# Patient Record
Sex: MALE | Race: WHITE | NOT HISPANIC OR LATINO | Employment: FULL TIME | URBAN - METROPOLITAN AREA
[De-identification: names, ages, dates, MRNs, and addresses within clinical notes are randomized per-mention and may not be internally consistent; named-entity substitution may affect disease eponyms.]

---

## 2022-03-19 ENCOUNTER — OFFICE VISIT (OUTPATIENT)
Dept: FAMILY MEDICINE CLINIC | Facility: CLINIC | Age: 27
End: 2022-03-19
Payer: COMMERCIAL

## 2022-03-19 VITALS
DIASTOLIC BLOOD PRESSURE: 68 MMHG | SYSTOLIC BLOOD PRESSURE: 128 MMHG | OXYGEN SATURATION: 99 % | BODY MASS INDEX: 28.08 KG/M2 | WEIGHT: 200.6 LBS | HEIGHT: 71 IN | HEART RATE: 80 BPM

## 2022-03-19 DIAGNOSIS — M25.511 ACUTE PAIN OF RIGHT SHOULDER: Primary | ICD-10-CM

## 2022-03-19 DIAGNOSIS — R19.03 RIGHT LOWER QUADRANT ABDOMINAL MASS: ICD-10-CM

## 2022-03-19 DIAGNOSIS — R10.11 RIGHT UPPER QUADRANT ABDOMINAL PAIN: ICD-10-CM

## 2022-03-19 PROCEDURE — 1036F TOBACCO NON-USER: CPT | Performed by: PHYSICIAN ASSISTANT

## 2022-03-19 PROCEDURE — 3008F BODY MASS INDEX DOCD: CPT | Performed by: PHYSICIAN ASSISTANT

## 2022-03-19 PROCEDURE — 99214 OFFICE O/P EST MOD 30 MIN: CPT | Performed by: PHYSICIAN ASSISTANT

## 2022-03-19 RX ORDER — DIPHENOXYLATE HYDROCHLORIDE AND ATROPINE SULFATE 2.5; .025 MG/1; MG/1
1 TABLET ORAL DAILY
COMMUNITY

## 2022-03-19 NOTE — PATIENT INSTRUCTIONS
Assessment/plan:  1  Right lower abdominal pain -possible hernia developing here  There is nothing obvious on exam and no bulge that is palpable at this time however patient does feel lump there periodically  No evidence of strangulated hernia  Would recommend CT scan of the abdomen to better assess and consider if surgical intervention is necessary  Symptoms have been chronic and no sign of acute process going on  2   Right shoulder pain- this has been going on for a few weeks and patient has pain at the anterior joint space as well as the serrated small school  Possibly 2 different etiologies going on  Would recommend consult with physical therapy  Consider further evaluation by orthopedic specialist if not improving in 2-4 weeks

## 2022-03-19 NOTE — PROGRESS NOTES
Assessment and Plan:     Patient Instructions     Assessment/plan:  1  Right lower abdominal pain -possible hernia developing here  There is nothing obvious on exam and no bulge that is palpable at this time however patient does feel lump there periodically  No evidence of strangulated hernia  Would recommend CT scan of the abdomen to better assess and consider if surgical intervention is necessary  Symptoms have been chronic and no sign of acute process going on  2   Right shoulder pain- this has been going on for a few weeks and patient has pain at the anterior joint space as well as the serrated small school  Possibly 2 different etiologies going on  Would recommend consult with physical therapy  Consider further evaluation by orthopedic specialist if not improving in 2-4 weeks  Problem List Items Addressed This Visit     None      Visit Diagnoses     Acute pain of right shoulder    -  Primary    Relevant Orders    Ambulatory Referral to Physical Therapy    Right upper quadrant abdominal pain        Relevant Orders    CT abdomen pelvis w contrast    Right lower quadrant abdominal mass        Relevant Orders    CT abdomen pelvis w contrast                 Diagnoses and all orders for this visit:    Acute pain of right shoulder  -     Ambulatory Referral to Physical Therapy; Future    Right upper quadrant abdominal pain  -     CT abdomen pelvis w contrast; Future    Right lower quadrant abdominal mass  -     CT abdomen pelvis w contrast; Future    Other orders  -     multivitamin (THERAGRAN) TABS; Take 1 tablet by mouth daily              Subjective:      Patient ID: Mari Brown is a 23 y o  male  CC:    Chief Complaint   Patient presents with   [facility] Establish Care    Abdominal Pain     Pt states he has noticed over the past few years there is swelling in his bottom right abdominal area and now he feels as though there is a " indent" there and a small lump and he wonders if he has a hernia   kw HPI:      HPI:  This is a 81-year-old gentleman that presents to the office with concerns over some discomfort in his right lower abdominal area  He has noticed somewhat of a indentation in his muscle in this area and occasionally feels a bulge or a lump in this area  It has been bothersome and aggravated when he is doing abdominal exercises or weightlifting which he does quite frequently  He notices that the symptoms have been going on for few months but seem to be getting worse  He has not had any fevers or other signs of infection present  He is also concerned because he has been having right shoulder pain which has been getting worse  This is been in the past few weeks and he is having pain in the axillary area as well as the anterior joint space  The following portions of the patient's history were reviewed and updated as appropriate: allergies, current medications, past family history, past medical history, past social history, past surgical history and problem list       Review of Systems   Constitutional: Negative for chills, fatigue and fever  HENT: Negative for congestion, ear pain and sinus pressure  Eyes: Negative for visual disturbance  Respiratory: Negative for cough, chest tightness and shortness of breath  Cardiovascular: Negative for chest pain and palpitations  Gastrointestinal: Positive for abdominal pain  Negative for diarrhea, nausea and vomiting  Endocrine: Negative for polyuria  Genitourinary: Negative for dysuria and frequency  Musculoskeletal: Positive for arthralgias and myalgias  Skin: Negative for pallor and rash  Neurological: Negative for dizziness, weakness, light-headedness, numbness and headaches  Psychiatric/Behavioral: Negative for agitation, behavioral problems and sleep disturbance  All other systems reviewed and are negative          Data to review:       Objective:    Vitals:    03/19/22 0831   BP: 128/68   BP Location: Left arm Patient Position: Sitting   Pulse: 80   SpO2: 99%   Weight: 91 kg (200 lb 9 6 oz)   Height: 5' 11 25" (1 81 m)        Physical Exam  Constitutional:       General: He is not in acute distress  Appearance: He is well-developed  HENT:      Head: Normocephalic and atraumatic  Right Ear: Tympanic membrane normal       Left Ear: Tympanic membrane normal    Eyes:      Conjunctiva/sclera: Conjunctivae normal    Cardiovascular:      Rate and Rhythm: Normal rate and regular rhythm  Pulmonary:      Effort: Pulmonary effort is normal    Abdominal:      General: Abdomen is flat  Bowel sounds are normal  There is no distension  Palpations: Abdomen is soft  There is no mass  Tenderness: There is abdominal tenderness in the right lower quadrant  There is no right CVA tenderness, left CVA tenderness, guarding or rebound  Comments:   Patient does have palpable indentation, possible fascial defect at the right rectus abdominis  There is no palpable mass or bulge noted here or definitive hernia present  Musculoskeletal:         General: Normal range of motion  Cervical back: Normal range of motion  Skin:     General: Skin is warm  Findings: No rash  Neurological:      Mental Status: He is alert and oriented to person, place, and time  Psychiatric:         Mood and Affect: Mood normal            BMI Counseling: Body mass index is 27 78 kg/m²  The BMI is above normal  Nutrition recommendations include decreasing portion sizes  Exercise recommendations include exercising 3-5 times per week  Rationale for BMI follow-up plan is due to patient being overweight or obese  BMI Counseling: Body mass index is 27 78 kg/m²  The BMI is above normal  Nutrition recommendations include reducing portion sizes

## 2022-03-31 ENCOUNTER — HOSPITAL ENCOUNTER (OUTPATIENT)
Dept: CT IMAGING | Facility: HOSPITAL | Age: 27
Discharge: HOME | End: 2022-03-31
Payer: COMMERCIAL

## 2022-03-31 DIAGNOSIS — R19.03 RIGHT LOWER QUADRANT ABDOMINAL MASS: ICD-10-CM

## 2022-03-31 DIAGNOSIS — R10.11 RIGHT UPPER QUADRANT ABDOMINAL PAIN: ICD-10-CM

## 2022-03-31 PROCEDURE — 74177 CT ABD & PELVIS W/CONTRAST: CPT

## 2022-03-31 RX ADMIN — IOHEXOL 100 ML: 350 INJECTION, SOLUTION INTRAVENOUS at 19:59

## 2022-04-05 ENCOUNTER — EVALUATION (OUTPATIENT)
Dept: PHYSICAL THERAPY | Facility: CLINIC | Age: 27
End: 2022-04-05
Payer: COMMERCIAL

## 2022-04-05 DIAGNOSIS — M25.511 ACUTE PAIN OF RIGHT SHOULDER: Primary | ICD-10-CM

## 2022-04-05 PROCEDURE — 97161 PT EVAL LOW COMPLEX 20 MIN: CPT

## 2022-04-05 PROCEDURE — 97110 THERAPEUTIC EXERCISES: CPT

## 2022-04-05 PROCEDURE — 97530 THERAPEUTIC ACTIVITIES: CPT

## 2022-04-05 NOTE — PROGRESS NOTES
PT Evaluation     Today's date: 2022  Patient name: Pari Lindquist  : 1995  MRN: [id number]  Referring provider: Rita Dubose PA-C  Dx:   Encounter Diagnosis     ICD-10-CM    1  Acute pain of right shoulder  M25 511 Ambulatory Referral to Physical Therapy                  Assessment  Assessment details: Pari Lindquist is a 23 y o  male who presents today to outpatient therapy for evaluation of acute pain of right shoulder  Upon assessment today, pt exhibits postural deviations; decreased/painful (R) shoulder AROM; decreased periscapular strength; TTP thru the (R) serratus anterior and pec; and decreased C/S AROM  These impairments are contributing to functional limitations with weight lifting and sitting comfortably  Pt would therefore benefit from PT intervention in order to address the aforementioned deficits so that he can return to his PLOF and function comfortably/safely in his home and surrounding environment  Thank you for the referral! - Warren Brand, PT, DPT  Impairments: abnormal or restricted ROM, impaired physical strength, pain with function, poor posture  and poor body mechanics  Understanding of Dx/Px/POC: good   Prognosis: good    Goals  STG 1: Pt will demonstrate compliance with HEP to supplement therapy in 1-2 weeks  STG 2: Pt will report 25% reduction in pain with functional activities in 2-4 weeks  STG 3: Pt will be able to reach [facility] with min increased symptoms in 6-8 weeks  LTG 1: Pt will be able to resume weight lifting at the gym with min to no difficulty related to the (R) UE in 6-8 weeks  LTG 2: Pt will be able to sit comfortably against a chair with min increased symptoms in 6-8 weeks   INITIAL: Pt reports difficulty secondary to immediate discomfort thru the (R) scapula    Plan  Plan details: Educated pt today about PT goals; prognosis; diagnosis; serratus anterior anatomy/function/sprain; role of scapula during shoulder elevation; scapular winging and relationship to SA; and role of scapular strength  Patient would benefit from: skilled physical therapy  Planned modality interventions: cryotherapy, TENS and unattended electrical stimulation  Planned therapy interventions: abdominal trunk stabilization, postural training, patient education, neuromuscular re-education, joint mobilization, manual therapy, massage, activity modification, balance, body mechanics training, Smalls taping, strengthening, stretching, therapeutic activities, coordination, flexibility, home exercise program, therapeutic exercise, functional ROM exercises and balance/weight bearing training  Frequency: 1x week  Duration in weeks: 6  Treatment plan discussed with: patient        Subjective Evaluation    History of Present Illness  Mechanism of injury: Pt reports onset of (R) arm pain a couple of months ago which occurred after performing repeated OH movement while working on his house  Later that night, he also experienced a pinched nerve in his upper back which resolved the next day  Since then, he has noticed increased difficulty performing weighted OH exercises at the gym without back support  He f/u with his doctor as a result who advised him to perform physical therapy  Currently, pt reports difficulty lifting OH/performing lateral raises at the gym and sitting against a firm chair secondary to (R) upper quarter pain  Eases: Rest, avoiding activity  Aggs: Lifting  Pain  Current pain ratin  At best pain ratin  At worst pain rating: 3  Location: Pt reports pain thru the (R) axilla/ribs and shoulder blade  Pt denies N/T or C/S pain  Patient Goals  Patient goals for therapy: return to sport/leisure activities          Objective     Postural Observations    Additional Postural Observation Details  Rounded shoulders    Observations     Additional Observation Details  Slight scapular winging present CESARIO  No scapular dyskinesias present during abd/forward flexion       Palpation     Additional Palpation Details  Mod TTP thru (R) serratus anterior, pec  LTG: Min TTP  Cervical/Thoracic Screen   Cervical range of motion within normal limits with the following exceptions: (R) lateral flex min dec, (L) rotation min dec  Thoracic range of motion within normal limits    Active Range of Motion   Left Shoulder   Flexion: 165 degrees   Abduction: 170 degrees   External rotation BTH: T3   Internal rotation BTB: T5     Right Shoulder   Flexion: 170 degrees with pain  Abduction: 162 degrees with pain  External rotation BTH: T3   Internal rotation BTB: T3     Joint Play   Left Shoulder  Joints within functional limits are the thoracic spine  Right Shoulder  Joints within functional limits are the thoracic spine       Strength/Myotome Testing     Left Shoulder     Planes of Motion   Flexion: 5   Abduction: 5   External rotation at 0°: 5   Internal rotation at 0°: 5     Isolated Muscles   Biceps: 5   Lower trapezius: 4+   Middle trapezius: 4+   Rhomboids: 4   Triceps: 5     Right Shoulder     Planes of Motion   Flexion: 5   Abduction: 5   External rotation at 0°: 5   Internal rotation at 0°: 5     Isolated Muscles   Biceps: 5   Lower trapezius: 4   Middle trapezius: 4   Rhomboids: 4-   Triceps: 4              Precautions: None  Date 4/5            FOTO IE            Re-Eval IE                Manuals 4/5                                                                Neuro Re-Ed 4/5            Quadruped Body Blade Scaption             1 Arm DB Stab             Bear Crawl + SA             Mod front plank /c SA (add Kbell pull when able) nv            Mod Side Plank (add no money when able)             Push Up Plus on Wall             Wall ABCs nv            Spidermans nv            No Moneys nv            OH Tband Pulses nv            Lawnmowers             Diaphragmatic breathing nv                         Ther Ex 4/5            Reach roll lift 15x ea            SA Punches nv            Supine Pec (S) on 1/2 Foam 2' HEP            Bear Hugs 20x            Supine cane flex OH on 1/2 foam  nv            Thread the Needle             Prone Y, Ts             Lat "Prayer" (S) nv            Forward flex with Tband for ER nv            Scaption             Wall [place] 10x HEP            Tband IR/ER @ 90/90             Iso Tband ER Walkouts in 90 deg elbow/shoulder flex nv            Downward Dog  nv            Dolphin Push Ups                                       Ther Activity 4/5            Retro UBE nv            Pulleys - Scaption nv            Offset OH Carry             3D Arm Swings             3D Arm Reach             Pt Edu 8' AL                         Gait Training 4/5                                      Modalities 4/5            Prn

## 2022-04-12 ENCOUNTER — OFFICE VISIT (OUTPATIENT)
Dept: PHYSICAL THERAPY | Facility: CLINIC | Age: 27
End: 2022-04-12
Payer: COMMERCIAL

## 2022-04-12 DIAGNOSIS — M25.511 ACUTE PAIN OF RIGHT SHOULDER: Primary | ICD-10-CM

## 2022-04-12 PROCEDURE — 97112 NEUROMUSCULAR REEDUCATION: CPT

## 2022-04-12 PROCEDURE — 97110 THERAPEUTIC EXERCISES: CPT

## 2022-04-12 PROCEDURE — 97530 THERAPEUTIC ACTIVITIES: CPT

## 2022-04-12 NOTE — PROGRESS NOTES
Daily Note     Today's date: 2022  Patient name: Ifeanyi Mendoza  : 1995  MRN: [id number]  Referring provider: Allie Charles PA-C  Dx:   Encounter Diagnosis     ICD-10-CM    1  Acute pain of right shoulder  M25 511        Start Time: 1720  Stop Time: 1800  Total time in clinic (min): 40 minutes  Subjective: Pt arrives to first f/u since IE reporting no significant changes in (R) shldr pain Sx  Pt notes HEP compliance - denies questions re: home program      Objective: See treatment diary below    Assessment: Implemented exercise diary per PT POC as indicated below - notified pt that interventions may be uncomfortable; however, pt should notify therapist of any increases in/sharp bouts of pain Sx  Pt is able to perform exercises /c GTB /c ease - provided pt /c update HEP and purple band for at home  Pt notes tightness during lat prayer stretch  Pt would benefit from continued PT  Plan: Cont /c PT POC  Progress as tolerated        Precautions: None  Date            Visit 1 2           FOTO IE            Re-Eval IE                Manuals                         Neuro Re-Ed            Quadruped Body Blade Scaption             1 Arm DB Stab             Bear Crawl + SA             Mod front plank /c SA (+ Kbell pull when able) nv            Mod Side Plank (+ no money when able)             Push Up Plus Wall             Wall ABCs nv GMB 30 circles ea           Spidermans nv GTBx15           No Moneys nv GTBx30           OH Tband Pulses nv GTBx30           Lawnmowers             Diaphragmatic breathing nv                         Ther Ex            Reach roll lift 15x ea            SA Punches nv            Supine Pec (S) 1/2 Foam 2' HEP            Bear Hugs 20x            Supine cane flex OH on 1/2 foam  nv            Thread the Needle             Prone Y, Ts             Lat "Prayer" (S) nv 10"x10           FF/c TB for ER nv PTBx20           Scaption Wall [place] 10x HEP            Tband IR/ER @ 90/90             Iso Tband ER Walkouts in 90 deg elbow/shoulder flex nv            Downward Dog  nv            Dolphin Push Ups                                       Ther Activity 4/5 04/12           Retro UBE nv 5' L1 twn pks           Pulleys - Scaption nv 5'            Offset OH Carry             3D Arm Swings             3D Arm Reach             Pt Edu 8' [place] SP HEP                        Modalities 4/5 04/12           Prn                              Yaneli Shah, PTA

## 2022-04-19 ENCOUNTER — OFFICE VISIT (OUTPATIENT)
Dept: PHYSICAL THERAPY | Facility: CLINIC | Age: 27
End: 2022-04-19
Payer: COMMERCIAL

## 2022-04-19 DIAGNOSIS — M25.511 ACUTE PAIN OF RIGHT SHOULDER: Primary | ICD-10-CM

## 2022-04-19 PROCEDURE — 97110 THERAPEUTIC EXERCISES: CPT

## 2022-04-19 PROCEDURE — 97530 THERAPEUTIC ACTIVITIES: CPT

## 2022-04-19 PROCEDURE — 97112 NEUROMUSCULAR REEDUCATION: CPT

## 2022-04-19 NOTE — PROGRESS NOTES
Daily Note     Today's date: 2022  Patient name: Cyndie Wu  : 1995  MRN: [id number]  Referring provider: Sarita Nash PA-C  Dx:   Encounter Diagnosis     ICD-10-CM    1  Acute pain of right shoulder  M25 511                   Subjective: Pt reports a little soreness thru the (R) shoulder following TE's LV, stating that he felt a "pinching" type pain thru the shoulder with certain movements at the gym  He continues to experience pain thru the (R) axilla as well which remains grossly unchanged since IE  Presently, he rates his pain as 3/10  Pt states that he sometimes notices (R) sided jaw/neck pain in the AM and while working out at the gym  Objective: See treatment diary below      Assessment: Tolerated treatment well  Moderate VC provided during iso Tband walkouts to maintain form  Educated pt today about role of diaphragmatic breathing for helping to stretch intercostals/SA and to retrain use of diaphragm for respiration; accessory breathing muscles; role of posture; use of mobility exercises prior to exercising; and provided pt with updated HEP  Instructed pt to perform stretches at home gently to tolerance  Patient demonstrated fatigue post treatment, exhibited good technique with therapeutic exercises and would benefit from continued PT to progress cervicothoracic AROM and scapular endurance to reduce stresses thru the upper quarter and improve pt's tolerance to reaching/lifting and pushing/pulling daily  Plan: Continue per plan of care  Progress treatment as tolerated         Precautions: None  Date           Visit 1 2 3          FOTO IE            Re-Eval IE                Manuals                        Neuro Re-Ed           Quadruped Body Blade Scaption             1 Arm DB Stab             Bear Crawl + SA             Quad SA Table Punches   20x          Mod front plank /c SA (+ Kbell pull when able) nv  2x40"           Mod Side Plank (+ no money when able)             Push Up Plus Wall             SA Tband Punch             Wall ABCs nv GMB 30 circles ea           Spidermans nv GTBx15           No Moneys nv GTBx30           OH Tband Pulses nv GTBx30           Lawnmowers             Diaphragmatic breathing nv  3'                       Ther Ex 4/5 04/12 4/19          Reach roll lift 15x ea            SA Punches nv            Supine Pec (S) 1/2 Foam 2' HEP            Bear Hugs 20x            Supine cane flex OH on 1/2 foam  nv  10x5"          Thread the Needle   5x10"          Prone Y, Ts in quadruped   10x ea CESARIO          Lat "Prayer" (S) nv 10"x10           FF/c TB for ER nv PTBx20           Scaption             Wall [place] 10x HEP            Tband IR/ER @ 90/90             Iso Tband ER Walkouts in 90 deg elbow/shoulder flex nv  1 5R 5x, 2 5R 5x          Downward Dog  nv  5x10"          Dolphin Push Ups                                       Ther Activity 4/5 04/12 4/19          Retro UBE nv 5' L1 twn pks 5' L1 twin peaks          Pulleys - Scaption nv 5'  5'          Offset OH Carry             3D Arm Swings             3D Arm Reach             Pt Edu 8' AL SP HEP AL                       Modalities 4/5 04/12 4/19          Prn

## 2022-04-26 ENCOUNTER — OFFICE VISIT (OUTPATIENT)
Dept: PHYSICAL THERAPY | Facility: CLINIC | Age: 27
End: 2022-04-26
Payer: COMMERCIAL

## 2022-04-26 DIAGNOSIS — M25.511 ACUTE PAIN OF RIGHT SHOULDER: Primary | ICD-10-CM

## 2022-04-26 PROCEDURE — 97112 NEUROMUSCULAR REEDUCATION: CPT

## 2022-04-26 PROCEDURE — 97110 THERAPEUTIC EXERCISES: CPT

## 2022-04-26 PROCEDURE — 97530 THERAPEUTIC ACTIVITIES: CPT

## 2022-04-26 NOTE — PROGRESS NOTES
Daily Note     Today's date: 2022  Patient name: Marcos Zacarias  : 1995  MRN: [id number]  Referring provider: Allie Charles PA-C  Dx:   Encounter Diagnosis     ICD-10-CM    1  Acute pain of right shoulder  M25 511        Start Time: 1723  Stop Time: 181  Total time in clinic (min): 47 minutes  Subjective: Pt reports that he believes his (R) shldr pain Sx are beginning to improve; reports feeling unstable in when reaching OH  Objective: See treatment diary below    Assessment: Pt able to complete interventions without c/o increased (R) shldr pain sx - does note fatigue /c activities requiring OH or elevation to 90*  Pt requires occasional cuing to slow speed of exercises - fair carryover  Pt requires only initial cuing for form /c new exercises - good carryover /c good recall of previously performed exercises as well  Pt demonstrated fatigue post treatment, exhibited good technique with therapeutic exercises and would benefit from continued PT to progress cervicothoracic AROM and scapular endurance to reduce stresses thru the upper quarter and improve pt's tolerance to reaching/lifting and pushing/pulling daily  Plan: Cont /c PT POC    Progress as tolerated     Precautions: None  Date          Visit 1 2 3 4         FOTO IE            Re-Eval IE                Manuals                       Neuro Re-Ed          Quadruped Body Blade Scaption             1 Arm DB Stab             Bear Crawl + SA             Quad SA Table Punches   20x          Mod front plank /c SA (+ Kbell pull when able) nv  2x40"           Mod Side Plank (+ no money when able)             Push Up Plus Wall             SA Tband Punch             Wall ABCs nv GMB 30 circles ea           Spidermans nv GTBx15  BTBx20         No Moneys nv GTBx30  BTBx15         OH Tband Pulses nv GTBx30           Lawnmowers             Diaphragmatic breathing nv  3'                       Ther Ex 4/5 04/12 4/19 04/26         Reach roll lift 15x ea            SA Punches nv   5# 5"x15         Supine Pec (S) 1/2 Foam 2' HEP            Bear Hugs 20x            Supine cane flex OH on 1/2 foam  nv  10x5"          Thread the Needle   5x10" 10ea         Prone Y, Ts in quadruped   10x ea CESARIO 20ea (B/L)         Lat "Prayer" (S) nv 10"x10  5"x15         FF/c TB for ER nv PTBx20           Scaption             Wall [place] 10x HEP            Tband IR/ER @ 90/90    1 5R (R) 20ea         Iso Tband ER Walkouts in 90 deg elbow/shoulder flex nv  1 5R 5x, 2 5R 5x 2R 30ea         Downward Dog  nv  5x10"          Dolphin Push Ups                                       Ther Activity 4/5 04/12 4/19 04/26         Retro UBE nv 5' L1 twn pks 5' L1 [place] 6' L2 twn pks         Pulleys - Scaption nv 5'  5' 5'          Offset OH Carry    R 8# L 18# 2'         3D Arm Swings             3D Arm Reach             Pt Edu 8' AL [place] HEP AL                       Modalities 4/5 04/12 4/19 04/26         Prn                              George Wolf, PTA

## 2022-05-03 ENCOUNTER — APPOINTMENT (OUTPATIENT)
Dept: PHYSICAL THERAPY | Facility: CLINIC | Age: 27
End: 2022-05-03
Payer: COMMERCIAL

## 2022-05-10 ENCOUNTER — OFFICE VISIT (OUTPATIENT)
Dept: PHYSICAL THERAPY | Facility: CLINIC | Age: 27
End: 2022-05-10
Payer: COMMERCIAL

## 2022-05-10 DIAGNOSIS — M25.511 ACUTE PAIN OF RIGHT SHOULDER: Primary | ICD-10-CM

## 2022-05-10 PROCEDURE — 97530 THERAPEUTIC ACTIVITIES: CPT

## 2022-05-10 PROCEDURE — 97110 THERAPEUTIC EXERCISES: CPT

## 2022-05-10 PROCEDURE — 97112 NEUROMUSCULAR REEDUCATION: CPT

## 2022-05-10 NOTE — PROGRESS NOTES
Daily Note     Today's date: 5/10/2022  Patient name: Rah Ann  : 1995  MRN: [id number]  Referring provider: Bernadette Collier PA-C  Dx:   Encounter Diagnosis     ICD-10-CM    1  Acute pain of right shoulder  M25 511        Start Time:   Stop Time: 173  Total time in clinic (min): 42 minutes  Subjective: Pt reports that he believes his (R) shldr pain Sx feels "about the same as last week" and cont to bother him the most when performing OH activities at the gym even if he uses a lighter weight  Objective: See treatment diary below    Assessment:  Pt is able to complete all interventions without c/o increased (R) shldr pain sx  Pt does note slight fatigue /c current program   Pt would benefit from continued PT to progress cervicothoracic AROM and scapular endurance to reduce stresses thru the upper quarter and improve pt's tolerance to reaching/lifting and pushing/pulling daily  Plan: Cont /c PT POC    Progress as tolerated     Precautions: None  Date 4/5 04/12 4/19 04/26 05/10        Visit 1 2 3 4 5        FOTO IE            Re-Eval IE                Manuals 4/5 04/12 4/19 04/26 05/10                     Neuro Re-Ed 4/5 04/12 4/19 04/26 05/10        Quadruped Body Blade Scaption             1 Arm DB Stab             Bear Crawl + SA             Quad SA Table Punches   20x          Mod front plank /c SA (+ Kbell pull when able) nv  2x40"           Mod Side Plank + no money      3# 30ea        Push Up Plus Wall             SA Tband Punch             Wall ABCs nv GMB 30 circles ea   YMB 30x cw/ccw (R)        Spidermans nv GTBx15  BTBx20 BTBx25        No Moneys nv GTBx30  BTBx15 BTBx25        OH Tband Pulses nv GTBx30   BTBx30        Lawnmowers             Diaphragmatic breathing nv  3'                       Ther Ex 4/5 04/12 4/19 04/26 05/10        Reach roll lift 15x ea            SA Punches nv   5# 5"x15 6# 5"x30 BTB        Supine Pec (S) 1/2 Foam 2' HEP    2' full        Bear Hugs 20x    2' Supine cane flex OH on 1/2 foam  nv  10x5"  4# 30x        Thread the Needle   5x10" 10ea         Prone Y, Ts in quadruped   10x ea CESARIO 20ea (B/L) 3# 30ea (B/L)        Lat "Prayer" (S) nv 10"x10  5"x15         FF/c TB for ER nv PTBx20           Scaption             Wall [place] 10x HEP            Tband IR/ER 90/90    1 5R (R) 20ea 2 5R (R) 30ea        Tband ER Walkouts 90/90  nv  1 5R 5x, 2 5R 5x 2R 30ea 3 5R (R) 3R 15ea        Downward Dog  nv  5x10"          Dolphin Push Ups                                       Ther Activity 4/5 04/12 4/19 04/26 05/10        Retro UBE nv 5' L1 twn pks 5' L1 twin peaks 6' L2 twn pks 3'/3' L3 twn pks        Pulleys - Scaption nv 5'  5' 5'          Offset OH Carry    R 8# L 18# 2'         3D Arm Swings             3D Arm Reach             Pt Edu 8' AL SP HEP AL  SP                     Modalities 4/5 04/12 4/19 04/26 05/10        Prn                              Buzz Beagle, PTA

## 2022-05-17 ENCOUNTER — OFFICE VISIT (OUTPATIENT)
Dept: PHYSICAL THERAPY | Facility: CLINIC | Age: 27
End: 2022-05-17
Payer: COMMERCIAL

## 2022-05-17 DIAGNOSIS — M25.511 ACUTE PAIN OF RIGHT SHOULDER: Primary | ICD-10-CM

## 2022-05-17 PROCEDURE — 97530 THERAPEUTIC ACTIVITIES: CPT

## 2022-05-17 PROCEDURE — 97140 MANUAL THERAPY 1/> REGIONS: CPT

## 2022-05-17 PROCEDURE — 97112 NEUROMUSCULAR REEDUCATION: CPT

## 2022-05-17 NOTE — PROGRESS NOTES
Daily Note     Today's date: 2022  Patient name: Zaria Gonzalez  : 1995  MRN: [id number]  Referring provider: Julia Crockett PA-C  Dx:   Encounter Diagnosis     ICD-10-CM    1  Acute pain of right shoulder  M25 511                   Subjective: Pt states that the pain he was previously having thru the (R) shoulder axilla/ribcage has improved  He continues to experience a different kind of pain thru the anterior aspect of the (R) shoulder which occurs with OH movements  Objective: See treatment diary below  Mod TTP thru (R) pec minor insertion  Assessment: Tolerated treatment well  Added several exercises today to progress OH strength/stability  Pt appropriately challenged by today's additions and throughout tx session  Trialed STM to (R) pec minor given pt's c/o pain and tenderness in this area however pt reported no change in (R) shoulder flex/cross body adduction afterwards therefore d/c NV  Pt continues to c/o (R) shoulder pain during OH press and reports feeling "unsupported" while doing so  This improved slightly upon decreasing weight  Discussed continuing to progress OH strength/endurance to support the shoulder during these tasks  Patient demonstrated fatigue post treatment, exhibited good technique with therapeutic exercises and would benefit from continued PT to progress UE strength/stability to improve pt's tolerance to pushing, pulling, and lifting daily  Plan: Continue per plan of care  Progress treatment as tolerated         Precautions: None  Date 4/5 04/12 4/19 04/26 05/10 5/17       Visit 1 2 3 4 5 6       FOTO IE     nv       Re-Eval IE                Manuals 4/5 04/12 4/19 04/26 05/10 5/17       STM Cross Friction (R) Pec Minor      AA                    Neuro Re-Ed 4/5 04/12 4/19 04/26 05/10 5/17       Quadruped Body Blade Scaption      2x30" (R)       1 Arm DB Stab             Bear Crawl + SA             Quad SA Table Punches   20x          Mod front plank /c SA (+ Kbell pull when able) nv  2x40"           Front Plank Clocks      Green 2x1'       Mod Side Plank + no money      3# 30ea        Push Up Plus Wall             Wall ABCs nv GMB 30 circles ea   YMB 30x cw/ccw (R)        Spidermans nv GTBx15  BTBx20 BTBx25        No Moneys nv GTBx30  BTBx15 BTBx25        OH Tband Pulses nv GTBx30   BTBx30        Lawnmowers      1 5R x10       Diaphragmatic breathing nv  3'                       Ther Ex 4/5 04/12 4/19 04/26 05/10 5/17       Reach roll lift 15x ea            SA Punches nv   5# 5"x15 6# 5"x30 BTB        Supine Pec (S) 1/2 Foam 2' HEP    2' full 2'        Bear Hugs 20x    2'         Supine cane flex OH on 1/2 foam  nv  10x5"  4# 30x        Thread the Needle   5x10" 10ea         Prone Y, Ts in quadruped   10x ea CESARIO 20ea (B/L) 3# 30ea (B/L)        Prone W's with Dowel      2x10       Lat "Prayer" (S) nv 10"x10  5"x15         FF/c TB for ER nv PTBx20           Scaption             Wall [address] 10x HEP     20x        Tband IR/ER 90/90    1 5R (R) 20ea 2 5R (R) 30ea 2 5R 20x IR only (R)       Tband ER Walkouts 90/90  nv  1 5R 5x, 2 5R 5x 2R 30ea 3 5R (R) 3R 15ea 3 5R 15x (R)       Downward Dog  nv  5x10"          Prone ER with (2) towels      4# 2x10                                 Ther Activity 4/5 04/12 4/19 04/26 05/10 5/17       Retro UBE nv 5' L1 twn pks 5' L1 twin peaks 6' L2 twn pks 3'/3' L3 twn pks 3'/3' L3 twin peaks       Pulleys - Scaption nv 5'  5' 5'          Offset OH Carry    R 8# L 18# 2'         1/2 Kneel OH Press      5# x10, 3# x10       3D Arm Swings             3D Arm Reach             Pt Edu 8' AL SP HEP AL  SP AA                    Modalities 4/5 04/12 4/19 04/26 05/10 5/17       Prn

## 2022-05-24 ENCOUNTER — APPOINTMENT (OUTPATIENT)
Dept: PHYSICAL THERAPY | Facility: CLINIC | Age: 27
End: 2022-05-24
Payer: COMMERCIAL

## 2022-05-26 ENCOUNTER — OFFICE VISIT (OUTPATIENT)
Dept: URGENT CARE | Facility: MEDICAL CENTER | Age: 27
End: 2022-05-26
Payer: COMMERCIAL

## 2022-05-26 VITALS
BODY MASS INDEX: 28 KG/M2 | TEMPERATURE: 99.6 F | OXYGEN SATURATION: 98 % | WEIGHT: 200 LBS | HEIGHT: 71 IN | HEART RATE: 104 BPM | RESPIRATION RATE: 20 BRPM

## 2022-05-26 DIAGNOSIS — J01.00 ACUTE NON-RECURRENT MAXILLARY SINUSITIS: Primary | ICD-10-CM

## 2022-05-26 DIAGNOSIS — J06.9 ACUTE URI: ICD-10-CM

## 2022-05-26 PROCEDURE — 99213 OFFICE O/P EST LOW 20 MIN: CPT

## 2022-05-26 PROCEDURE — 87636 SARSCOV2 & INF A&B AMP PRB: CPT

## 2022-05-26 RX ORDER — AZITHROMYCIN 250 MG/1
TABLET, FILM COATED ORAL
Qty: 6 TABLET | Refills: 0 | Status: SHIPPED | OUTPATIENT
Start: 2022-05-26 | End: 2022-05-30

## 2022-05-26 RX ORDER — PSEUDOEPHEDRINE HCL 120 MG/1
120 TABLET, FILM COATED, EXTENDED RELEASE ORAL 2 TIMES DAILY
Qty: 20 TABLET | Refills: 0 | Status: SHIPPED | OUTPATIENT
Start: 2022-05-26

## 2022-05-26 NOTE — PROGRESS NOTES
[facility]        NAME: Shi Meehan is a 33 y o  male  : 1995    MRN: [id number]  DATE: May 26, 2022  TIME: 8:42 PM    Assessment and Plan   Acute non-recurrent maxillary sinusitis [J01 00]  1  Acute non-recurrent maxillary sinusitis  pseudoephedrine (SUDAFED) 120 MG 12 hr tablet    azithromycin (ZITHROMAX) 250 mg tablet   2  Acute URI  Covid/Flu-Office Collect     Patient states last week he was having  congestion and having body aches, started feeling better a little better over the weekened  Today woke up and started having worsening body aches, HA, and worsening congestion with post nasal drip  Chills at times, Denies sick contacts  States sinus pressure and inability to get congestion out  When he does it is dark colored  At this time assessment aligns with sinusitis  Will order a COVID/flu per request  as well as a zpak and sudafed  Increase fluids and take OTC tylenol/motrin    Patient Instructions     Take medications as prescribed  Follow up with PCP as needed    Chief Complaint     Chief Complaint   Patient presents with    Cold Like Symptoms     Headache, body aches, nasal congestion, and sore throat x 1 week  Negative at home covid test today  Is covid vaccinated  History of Present Illness       Patient states last week he was having  congestion and having body aches, started feeling better a little better over the weekened  Today woke up and started having worsening body aches, HA, and worsening congestion with post nasal drip  Chills at times, Denies sick contacts  States sinus pressure and inability to get congestion out  When he does it is dark colored  Review of Systems   Review of Systems   Constitutional: Positive for chills, fatigue and fever  HENT: Positive for congestion, postnasal drip, sinus pressure, sinus pain and sore throat  Negative for ear discharge and ear pain  Eyes: Negative for pain and discharge     Respiratory: Negative for cough and shortness of breath  Cardiovascular: Negative for chest pain and palpitations  Gastrointestinal: Negative for abdominal pain, diarrhea, nausea and vomiting  Genitourinary: Negative for difficulty urinating and dysuria  Musculoskeletal: Positive for myalgias  Negative for arthralgias  Skin: Negative for rash  Neurological: Positive for headaches  Negative for dizziness, syncope, light-headedness and numbness  Psychiatric/Behavioral: Negative for agitation  All other systems reviewed and are negative  Current Medications       Current Outpatient Medications:     azithromycin (ZITHROMAX) 250 mg tablet, Take 2 tablets today then 1 tablet daily x 4 days, Disp: 6 tablet, Rfl: 0    multivitamin (THERAGRAN) TABS, Take 1 tablet by mouth daily, Disp: , Rfl:     pseudoephedrine (SUDAFED) 120 MG 12 hr tablet, Take 1 tablet (120 mg total) by mouth 2 (two) times a day, Disp: 20 tablet, Rfl: 0    Current Allergies     Allergies as of 05/26/2022    (No Known Allergies)            The following portions of the patient's history were reviewed and updated as appropriate: allergies, current medications, past family history, past medical history, past social history, past surgical history and problem list      History reviewed  No pertinent past medical history  No past surgical history on file  Family History   Problem Relation Age of Onset    Diabetes Maternal Grandmother     Heart disease Maternal Grandfather          Medications have been verified  Objective   Pulse 104   Temp 99 6 °F (37 6 °C)   Resp 20   Ht 5' 11" (1 803 m)   Wt 90 7 kg (200 lb)   SpO2 98%   BMI 27 89 kg/m²   No LMP for male patient  Physical Exam     Physical Exam  Vitals reviewed  Constitutional:       General: He is not in acute distress  Appearance: Normal appearance  He is not ill-appearing  HENT:      Head: Normocephalic and atraumatic        Right Ear: Tympanic membrane and ear canal normal  No middle ear effusion  Left Ear: Tympanic membrane and ear canal normal   No middle ear effusion  Nose: Congestion and rhinorrhea present  Mouth/Throat:      Mouth: Mucous membranes are moist       Pharynx: Posterior oropharyngeal erythema present  No oropharyngeal exudate  Tonsils: No tonsillar exudate  Eyes:      Extraocular Movements: Extraocular movements intact  Conjunctiva/sclera: Conjunctivae normal       Pupils: Pupils are equal, round, and reactive to light  Cardiovascular:      Rate and Rhythm: Normal rate and regular rhythm  Pulses: Normal pulses  Heart sounds: Normal heart sounds  No murmur heard  Pulmonary:      Effort: Pulmonary effort is normal  No respiratory distress  Breath sounds: Normal breath sounds  No wheezing  Abdominal:      General: Bowel sounds are normal  There is no distension  Palpations: Abdomen is soft  Tenderness: There is no abdominal tenderness  There is no guarding  Musculoskeletal:         General: Normal range of motion  Cervical back: Normal range of motion and neck supple  No tenderness  Lymphadenopathy:      Cervical: Cervical adenopathy present  Skin:     General: Skin is warm  Neurological:      General: No focal deficit present  Mental Status: He is alert     Psychiatric:         Mood and Affect: Mood normal          Behavior: Behavior normal          Judgment: Judgment normal

## 2022-05-27 LAB
FLUAV RNA RESP QL NAA+PROBE: NEGATIVE
FLUBV RNA RESP QL NAA+PROBE: NEGATIVE
SARS-COV-2 RNA RESP QL NAA+PROBE: NEGATIVE

## 2022-05-31 ENCOUNTER — EVALUATION (OUTPATIENT)
Dept: PHYSICAL THERAPY | Facility: CLINIC | Age: 27
End: 2022-05-31
Payer: COMMERCIAL

## 2022-05-31 DIAGNOSIS — M25.511 ACUTE PAIN OF RIGHT SHOULDER: Primary | ICD-10-CM

## 2022-05-31 PROCEDURE — 97164 PT RE-EVAL EST PLAN CARE: CPT

## 2022-05-31 PROCEDURE — 97530 THERAPEUTIC ACTIVITIES: CPT

## 2022-05-31 PROCEDURE — 97110 THERAPEUTIC EXERCISES: CPT

## 2022-05-31 NOTE — PROGRESS NOTES
PT Re-Evaluation     Today's date: 2022  Patient name: Checo Duff  : 1995  MRN: [id number]  Referring provider: Russell Estrada PA-C  Dx:   Encounter Diagnosis     ICD-10-CM    1  Acute pain of right shoulder  M25 511                   Assessment  Assessment details: To date, Mr Aidan Bassett has participated in a total of 7 skilled therapy sessions for tx of acute right shoulder pain  Upon reassessment today, he is making slow but steady progress toward his goals  Objectively, he demonstrates increasing periscapular strength; increased C/S AROM; less pain during (R) shoulder AROM; and he no longer exhibits scapular winging CESARIO  Subjectively, he reports an improving ability reaching OH; sitting comfortably; and he has been able to resume most of his previous exercise regimen  He continues to report difficulty lifting OH secondary to feelings of instability and he notes continued sensitivity thru the (R) scapula when sitting  He continues to present with TTP thru the (R) upper quarter; postural deviations; and decreased periscapular strength  He would therefore benefit from 1-2 more PT sessions in order to address the aforementioned deficits so that he can return to his PLOF and function comfortably/safely in his home and surrounding environment  Goal is to then transition fully to HEP  Impairments: abnormal or restricted ROM, impaired physical strength, pain with function, poor posture  and poor body mechanics  Understanding of Dx/Px/POC: good   Prognosis: good    Goals  STG 1: Pt will demonstrate compliance with HEP to supplement therapy in 1-2 weeks  MET  STG 2: Pt will report 25% reduction in pain with functional activities in 2-4 weeks  NOT MET  STG 3: Pt will be able to reach [facility] with min increased symptoms in 6-8 weeks   NOT MET - pt continues to c/o "pinching" sensation while doing so  LTG 1: Pt will be able to resume weight lifting at the gym with min to no difficulty related to the (R) UE in 6-8 weeks  NEARLY MET - pt has cut back on OH movement however has otherwise resumed previous regimen  LTG 2: Pt will be able to sit comfortably against a chair with min increased symptoms in 6-8 weeks  CURRENT: Pt states that this has been improving, in the last week or so he has had increased S/S which result in some difficulty sitting secondary to a "rubbing" sensation thru the scapula    Plan  Plan details: Educated pt today about PT POC; prognosis; indications for x-ray; and red flags  Pt to resume therapy in 3 weeks secondary to being OOT  He will continue with HEP and has been instructed to f/u with therapist if he has any questions in the meantime  He is scheduled for x1 more f/u visit which is tentatively a d/c  Patient would benefit from: skilled physical therapy  Planned modality interventions: cryotherapy, TENS and unattended electrical stimulation  Planned therapy interventions: abdominal trunk stabilization, postural training, patient education, neuromuscular re-education, joint mobilization, manual therapy, massage, activity modification, balance, body mechanics training, Smalls taping, strengthening, stretching, therapeutic activities, coordination, flexibility, home exercise program, therapeutic exercise, functional ROM exercises and balance/weight bearing training  Frequency: 1x week  Duration in weeks: 4  Treatment plan discussed with: patient        Subjective Evaluation    History of Present Illness  Mechanism of injury: Pt states that since coming to therapy, he is not feeling as much pain thru the (R) arm "most of the time " He states that this week, however, he has been having a little bit more pain than before of unknown etiology  He continues to report a "lack of support" when pushing/lifting OH especially when unsupported (although states that this has gotten a little better overall)   He also occasionally experiences a "pinching" pain thru the (R) anterior shoulder when reaching [facility] which has been ongoing for a few years  Pain  Current pain ratin  At best pain ratin  At worst pain ratin  Location: Pt reports pain thru the (R) axilla/ribs and a "pinching" pain thru the shoulder  Pt denies N/T or C/S pain  Patient Goals  Patient goals for therapy: return to sport/leisure activities          Objective     Postural Observations    Additional Postural Observation Details  Rounded shoulders    Observations     Additional Observation Details  Pt no longer exhibits scapular winging  No scapular dyskinesias present during abd/forward flexion  Palpation     Additional Palpation Details  Min-mod TTP thru (R) serratus anterior, pec  LTG: Min TTP  Cervical/Thoracic Screen   Cervical range of motion within normal limits  Thoracic range of motion within normal limits    Active Range of Motion   Left Shoulder   Flexion: 165 degrees   Abduction: 170 degrees   External rotation BTH: T3   Internal rotation BTB: T5     Right Shoulder   Flexion: 170 ("discomfort") degrees   Abduction: 164 degrees   External rotation BTH: T3   Internal rotation BTB: T3     Joint Play   Left Shoulder  Joints within functional limits are the thoracic spine  Right Shoulder  Joints within functional limits are the thoracic spine       Strength/Myotome Testing     Left Shoulder     Planes of Motion   Flexion: 5   Abduction: 5   External rotation at 0°: 5   Internal rotation at 0°: 5     Isolated Muscles   Biceps: 5   Lower trapezius: 4+   Middle trapezius: 4+   Rhomboids: 4+   Triceps: 5     Right Shoulder     Planes of Motion   Flexion: 5   Abduction: 5   External rotation at 0°: 5   Internal rotation at 0°: 5     Isolated Muscles   Biceps: 5   Lower trapezius: 4+   Middle trapezius: 4+   Rhomboids: 4   Triceps: 4              Precautions: None     Date 4/5 04/12 4/19 04/26 05/10 5/17 5/31      Visit 1 2 3 4 5 6 7      FOTO IE     nv xx      Re-Eval IE      xx          Manuals 4/5 04/12 4/19 04/26 05/10 5/17 5/31      STM Cross Friction (R) Pec Minor      AA       Re-Eval       AA                   Neuro Re-Ed 4/5 04/12 4/19 04/26 05/10 5/17 5/31      Quadruped Body Blade Scaption      2x30" (R)       1 Arm DB Stab             Bear Crawl + SA             Quad SA Table Punches   20x          Mod front plank /c SA (+ Kbell pull when able) nv  2x40"           Front TURNER Energy      Green 2x1'       Mod Side Plank + no money      3# 30ea        Push Up Plus Wall             Wall ABCs nv GMB 30 circles ea   YMB 30x cw/ccw (R)        Spidermans nv GTBx15  BTBx20 BTBx25        No Moneys nv GTBx30  BTBx15 BTBx25        OH Tband Pulses nv GTBx30   BTBx30        Lawnmowers      1 5R x10       Diaphragmatic breathing nv  3'                       Ther Ex 4/5 04/12 4/19 04/26 05/10 5/17 5/31      Reach roll lift 15x ea            SA Punches nv   5# 5"x15 6# 5"x30 BTB        Supine Pec (S) 1/2 Foam 2' HEP    2' full 2'  2'      Bear Hugs 20x    2'   2'      Supine cane flex OH on 1/2 foam  nv  10x5"  4# 30x        Thread the Needle   5x10" 10ea         Prone Y, Ts in quadruped   10x ea CESARIO 20ea (B/L) 3# 30ea (B/L)        Prone W's with Dowel      2x10 2x10      Lat "Prayer" (S) nv 10"x10  5"x15         FF/c TB for ER nv PTBx20           Scaption             Wall [place] 10x HEP     20x        Tband IR/ER 90/90    1 5R (R) 20ea 2 5R (R) 30ea 2 5R 20x IR only (R)       Tband ER Walkouts 90/90  nv  1 5R 5x, 2 5R 5x 2R 30ea 3 5R (R) 3R 15ea 3 5R 15x (R)       Downward Dog  nv  5x10"          Prone ER with (2) towels      4# 2x10                                 Ther Activity 4/5 04/12 4/19 04/26 05/10 5/17 5/31      Retro UBE nv 5' L1 twn pks 5' L1 twin peaks 6' L2 twn pks 3'/3' L3 twn pks 3'/3' L3 twin peaks 3'/3' L3 twin peaks      Pulleys - Scaption nv 5'  5' 5'          Offset OH Carry    R 8# L 18# 2'         1/2 Kneel OH Press      5# x10, 3# x10       3D Arm Swings             3D Arm Reach             Pt Edu 8' AL SP HEP AL SP AA AA                   Modalities 4/5 04/12 4/19 04/26 05/10 5/17 5/31      Prn

## 2022-06-21 ENCOUNTER — OFFICE VISIT (OUTPATIENT)
Dept: PHYSICAL THERAPY | Facility: CLINIC | Age: 27
End: 2022-06-21
Payer: COMMERCIAL

## 2022-06-21 DIAGNOSIS — M25.511 ACUTE PAIN OF RIGHT SHOULDER: Primary | ICD-10-CM

## 2022-06-21 PROCEDURE — 97112 NEUROMUSCULAR REEDUCATION: CPT

## 2022-06-21 PROCEDURE — 97530 THERAPEUTIC ACTIVITIES: CPT

## 2022-06-21 PROCEDURE — 97110 THERAPEUTIC EXERCISES: CPT

## 2022-06-21 NOTE — PROGRESS NOTES
Daily Note & Discharge     Today's date: 2022  Patient name: Stanford Seymour  : 1995  MRN: [id number]  Referring provider: Andrey Salomon PA-C  Dx:   Encounter Diagnosis     ICD-10-CM    1  Acute pain of right shoulder  M25 511                   Subjective: Pt presents to today's session following three week hiatus secondary to performing HEP/being OOT on vacation  Pt states that while he notices improvements with his (R) shoulder strength when lifting/pushing OH, he continues to experience pain while doing so and has occasional feelings of weakness/"cramping" thru (R) shoulder musculature  Pt reports compliance with HEP to date  Objective: See treatment diary below      Assessment: Tolerated treatment well  Pt able to complete all TE's today without adverse reaction  Provided pt updated HEP and reviewed home exercise set up and modifications/progressions  Encouraged pt to trial mobility exercises prior to exercise routine at the gym to address c/o tightness/cramping  Pt in agreement to d/c to HEP per recent re-evaluation  Instructed pt to continue with HEP for the next month; if S/S persist and/or worsen, encouraged pt to schedule f/u with MD regarding: next steps  Plan: D/c to HEP  F/u with therapist if any questions arise       Precautions: None     Date 4/5 04/12 4/19 04/26 05/10 5/17 5/31 6/21     Visit 1 2 3 4 5 6 7 8     FOTO IE     nv xx xx     Re-Eval IE      xx          Manuals 4/5 04/12 4/19 04/26 05/10 5/17 5/31 6/21     STM Cross Friction (R) Pec Minor      AA       Re-Eval       AA                   Neuro Re-Ed 4/5 04/12 4/19 04/26 05/10 5/17 5/31 6/21     Quadruped Body Blade Scaption      2x30" (R)  2x30" (R)     1 Arm DB Stab             Bear Crawl + SA             Quad SA Table Punches   20x          Mod front plank /c SA (+ Kbell pull when able) nv  2x40"           Front TUR[employer]      Green 2x1'  Green 2x1'     Mod Side Plank + no money      3# 30ea   3# 25x ea full Push Up Plus Wall             Wall ABCs nv GMB 30 circles ea   YMB 30x cw/ccw (R)   RMB 2x thru (R)     Spidermans nv GTBx15  BTBx20 BTBx25        No Moneys nv GTBx30  BTBx15 BTBx25        OH Tband Pulses nv GTBx30   BTBx30        Lawnmowers      1 5R x10  1 5R 20x     Diaphragmatic breathing nv  3'                       Ther Ex 4/5 04/12 4/19 04/26 05/10 5/17 5/31 6/21     Reach roll lift 15x ea            SA Punches nv   5# 5"x15 6# 5"x30 BTB        Supine Pec (S) 1/2 Foam 2' HEP    2' full 2'  2'      Bear Hugs 20x    2'   2'      Supine cane flex OH on 1/2 foam  nv  10x5"  4# 30x        Thread the Needle   5x10" 10ea         Prone Y, Ts in quadruped   10x ea CESARIO 20ea (B/L) 3# 30ea (B/L)        Prone W's with Dowel      2x10 2x10      Lat "Prayer" (S) nv 10"x10  5"x15    10x5"     FF/c TB for ER nv PTBx20           Scaption             Wall [place] 10x HEP     20x   20x     Tband IR/ER 90/90    1 5R (R) 20ea 2 5R (R) 30ea 2 5R 20x IR only (R)       Tband ER Walkouts 90/90  nv  1 5R 5x, 2 5R 5x 2R 30ea 3 5R (R) 3R 15ea 3 5R 15x (R)       Downward Dog  nv  5x10"          Prone ER with (2) towels      4# 2x10  4# 2x10                               Ther Activity 4/5 04/12 4/19 04/26 05/10 5/17 5/31 6/21     Retro UBE nv 5' L1 twn pks 5' L1 twin peaks 6' L2 twn pks 3'/3' L3 twn pks 3'/3' L3 twin peaks 3'/3' L3 twin peaks 3'/3' L3 twin peaks     Pulleys - Scaption nv 5'  5' 5'          Offset OH Carry    R 8# L 18# 2'         1/2 Kneel OH Press      5# x10, 3# x10  3# x10     3D Arm Swings             3D Arm Reach             Pt Edu 8' AL SP HEP AL  SP AA AA AA                  Modalities 4/5 04/12 4/19 04/26 05/10 5/17 5/31 6/21     Prn

## 2022-07-22 ENCOUNTER — RA CDI HCC (OUTPATIENT)
Dept: OTHER | Facility: HOSPITAL | Age: 27
End: 2022-07-22

## 2022-07-22 NOTE — PROGRESS NOTES
[facility]  coding opportunities       Chart reviewed, no opportunity found: CHART REVIEWED, NO OPPORTUNITY FOUND        Patients Insurance        Commercial Insurance: [address] [place] [address]

## 2022-07-29 ENCOUNTER — OFFICE VISIT (OUTPATIENT)
Dept: FAMILY MEDICINE CLINIC | Facility: CLINIC | Age: 27
End: 2022-07-29
Payer: COMMERCIAL

## 2022-07-29 DIAGNOSIS — N50.811 RIGHT TESTICULAR PAIN: ICD-10-CM

## 2022-07-29 DIAGNOSIS — R10.9 RIGHT LATERAL ABDOMINAL PAIN: Primary | ICD-10-CM

## 2022-07-29 DIAGNOSIS — F41.1 GAD (GENERALIZED ANXIETY DISORDER): ICD-10-CM

## 2022-07-29 DIAGNOSIS — Z00.00 HEALTH CARE MAINTENANCE: ICD-10-CM

## 2022-07-29 DIAGNOSIS — D22.9 MULTIPLE NEVI: ICD-10-CM

## 2022-07-29 PROCEDURE — 99214 OFFICE O/P EST MOD 30 MIN: CPT | Performed by: PHYSICIAN ASSISTANT

## 2022-07-29 NOTE — PROGRESS NOTES
Assessment and Plan:  Patient Instructions    Assessment/plan:    Abdominal pain-right lower quadrant  Patient did have negative CT scan  Symptoms have been chronic  Most likely related to abdominal muscle wall  I am not able to palpate any hernia and no hernia seen on CT  Would consider further evaluation by General surgery  2   Testicular pain-possibly related to right lower abdominal pain  Will order ultrasound of the scrotum to rule out abnormality  Scrotal exam is benign however  3   Multiple nevi-recommend evaluation by dermatology for regular checkups  4   Generalized anxiety disorder -treatment options discussed  Patient is interested in starting Zoloft therapy and we will start at 50 mg daily  Possible side effects discussed  5   Healthcare maintenance-recommend assessing baseline labs and follow-up with results as necessary  Problem List Items Addressed This Visit    None     Visit Diagnoses     Right lateral abdominal pain    -  Primary    Relevant Orders    Ambulatory Referral to General Surgery    Right testicular pain        Relevant Orders    US scrotum and testicles    DAFNE (generalized anxiety disorder)        Relevant Medications    sertraline (Zoloft) 50 mg tablet    Multiple nevi        Relevant Orders    Ambulatory Referral to Dermatology    Health care maintenance        Relevant Orders    CBC and differential    Comprehensive metabolic panel    Lipid Panel with Direct LDL reflex    TSH, 3rd generation with Free T4 reflex                 Diagnoses and all orders for this visit:    Right lateral abdominal pain  -     Ambulatory Referral to General Surgery; Future    Right testicular pain  -     US scrotum and testicles; Future    DAFNE (generalized anxiety disorder)  -     sertraline (Zoloft) 50 mg tablet; Take 1 tablet (50 mg total) by mouth daily    Multiple nevi  -     Ambulatory Referral to Dermatology;  Future    Health care maintenance  -     CBC and differential  - Comprehensive metabolic panel  -     Lipid Panel with Direct LDL reflex  -     TSH, 3rd generation with Free T4 reflex            Subjective:      Patient ID: Adria Corcoran is a 33 y o  male  CC:    Chief Complaint   Patient presents with    Physical Exam     Routine Physical  Pt also has complaints about ongoing right sided abdominal pain  kw       HPI:     HPI:  This is a 31-year-old gentleman that presents to the office for follow-up of abdominal pain which does seem to radiate down into his right testicular area  This has been going on for at least 4 months  He was seen in March and had CT scan of the abdomen which was normal   He continues with some pain in the right lower abdomen especially after doing abdominal exercises  He has noticed some abnormality of the right testicle however, after the shower he feels that it kind of lifts up to the side and feels a little bit tight  He is interested in making sure that nothing serious is wrong  Pain at its worse is a 4 or 5/10  It does not stop him from doing anything that he enjoys  He would also like to discuss symptoms of generalized anxiety disorder  He feels that he has anxiety especially in social situations  This is been going on for several months to years and has been getting worse lately  He has done some research and is interested in trying Zoloft  He also has multiple moles on his body that he would like evaluated  He has not had any general checkup for quite some time and has never had blood work as far as he knows  There is some family history of cancer in his sister having breast cancer  He has also had some cardiac disease in a grandparent        The following portions of the patient's history were reviewed and updated as appropriate: allergies, current medications, past family history, past medical history, past social history, past surgical history and problem list       Review of Systems   Constitutional: Negative for chills, fatigue and fever  HENT: Negative for congestion, ear pain and sinus pressure  Eyes: Negative for visual disturbance  Respiratory: Negative for cough, chest tightness and shortness of breath  Cardiovascular: Negative for chest pain and palpitations  Gastrointestinal: Positive for abdominal pain  Negative for diarrhea, nausea and vomiting  Endocrine: Negative for polyuria  Genitourinary: Negative for dysuria and frequency  Musculoskeletal: Negative for arthralgias and myalgias  Skin: Negative for pallor and rash  Neurological: Negative for dizziness, weakness, light-headedness, numbness and headaches  Psychiatric/Behavioral: Negative for agitation, behavioral problems and sleep disturbance  All other systems reviewed and are negative  Data to review:       Objective: There were no vitals filed for this visit  Physical Exam  Constitutional:       General: He is not in acute distress  Appearance: He is well-developed  HENT:      Head: Normocephalic and atraumatic  Right Ear: Tympanic membrane normal       Left Ear: Tympanic membrane normal    Eyes:      Conjunctiva/sclera: Conjunctivae normal    Cardiovascular:      Rate and Rhythm: Normal rate and regular rhythm  Pulmonary:      Effort: Pulmonary effort is normal    Abdominal:      General: Abdomen is flat  Bowel sounds are normal  There is no distension  Palpations: Abdomen is soft  There is no mass  Musculoskeletal:         General: Normal range of motion  Cervical back: Normal range of motion  Skin:     General: Skin is warm  Findings: No rash  Comments:   Multiple nevi of the chest and back  Neurological:      Mental Status: He is alert and oriented to person, place, and time  Psychiatric:         Mood and Affect: Mood normal              Depression Screening and Follow-up Plan: Patient was screened for depression during today's encounter   They screened negative with a PHQ-2 score of 0

## 2022-07-29 NOTE — PATIENT INSTRUCTIONS
Assessment/plan:    Abdominal pain-right lower quadrant  Patient did have negative CT scan  Symptoms have been chronic  Most likely related to abdominal muscle wall  I am not able to palpate any hernia and no hernia seen on CT  Would consider further evaluation by General surgery  2   Testicular pain-possibly related to right lower abdominal pain  Will order ultrasound of the scrotum to rule out abnormality  Scrotal exam is benign however  3   Multiple nevi-recommend evaluation by dermatology for regular checkups  4   Generalized anxiety disorder -treatment options discussed  Patient is interested in starting Zoloft therapy and we will start at 50 mg daily  Possible side effects discussed  5   Healthcare maintenance-recommend assessing baseline labs and follow-up with results as necessary

## 2022-09-30 ENCOUNTER — OFFICE VISIT (OUTPATIENT)
Dept: URGENT CARE | Facility: MEDICAL CENTER | Age: 27
End: 2022-09-30
Payer: COMMERCIAL

## 2022-09-30 VITALS
HEART RATE: 83 BPM | SYSTOLIC BLOOD PRESSURE: 142 MMHG | TEMPERATURE: 98.1 F | RESPIRATION RATE: 18 BRPM | OXYGEN SATURATION: 99 % | DIASTOLIC BLOOD PRESSURE: 81 MMHG

## 2022-09-30 DIAGNOSIS — J02.9 ACUTE PHARYNGITIS, UNSPECIFIED ETIOLOGY: Primary | ICD-10-CM

## 2022-09-30 DIAGNOSIS — H61.23 BILATERAL IMPACTED CERUMEN: ICD-10-CM

## 2022-09-30 LAB — S PYO AG THROAT QL: NEGATIVE

## 2022-09-30 PROCEDURE — 87880 STREP A ASSAY W/OPTIC: CPT

## 2022-09-30 PROCEDURE — 99213 OFFICE O/P EST LOW 20 MIN: CPT

## 2022-09-30 RX ORDER — AMOXICILLIN 500 MG/1
500 CAPSULE ORAL EVERY 12 HOURS SCHEDULED
Qty: 20 CAPSULE | Refills: 0 | Status: SHIPPED | OUTPATIENT
Start: 2022-09-30 | End: 2022-10-10

## 2022-09-30 NOTE — PATIENT INSTRUCTIONS
Ear lavage performed in office today for cerumen impaction   Rapid point of care strep testing negative  Take antibiotic as prescribed   Continue with supportive measures, OTC Tylenol/Ibuprofen, cool mist humidifiers, throat lozenges, honey, increased fluid intake and rest   Follow up with PCP in 3-5 days  Present to ER if symptoms worsen     Pharyngitis   AMBULATORY CARE:   Pharyngitis , or sore throat, is inflammation of the tissues and structures in your pharynx (throat)  Pharyngitis is most often caused by bacteria  It may also be caused by a cold or flu virus  Other causes include smoking, allergies, or acid reflux  Signs and symptoms that may occur with pharyngitis:   Sore throat or pain when you swallow    Fever, chills, and body aches    Hoarse or raspy voice    Cough, runny or stuffy nose, itchy or watery eyes    Headache    Upset stomach and loss of appetite    Mild neck stiffness    Swollen glands that feel like hard lumps when you touch your neck    White and yellow pus-filled blisters in the back of your throat    Call 911 for any of the following: You have trouble breathing or swallowing because your throat is swollen or sore  Seek care immediately if:   You are drooling because it hurts too much to swallow  Your fever is higher than 102? F (39?C) or lasts longer than 3 days  You are confused  You taste blood in your throat  Contact your healthcare provider if:   Your throat pain gets worse  You have a painful lump in your throat that does not go away after 5 days  Your symptoms do not improve after 5 days  You have questions or concerns about your condition or care  Treatment for pharyngitis:  Viral pharyngitis will go away on its own without treatment  Your sore throat should start to feel better in 3 to 5 days for both viral and bacterial infections  You may need any of the following:  Antibiotics  treat a bacterial infection      NSAIDs , such as ibuprofen, help decrease swelling, pain, and fever  NSAIDs can cause stomach bleeding or kidney problems in certain people  If you take blood thinner medicine, always ask your healthcare provider if NSAIDs are safe for you  Always read the medicine label and follow directions  Acetaminophen  decreases pain and fever  It is available without a doctor's order  Ask how much to take and how often to take it  Follow directions  Acetaminophen can cause liver damage if not taken correctly  Manage your symptoms:   Gargle salt water  Mix ¼ teaspoon salt in an 8 ounce glass of warm water and gargle  This may help decrease swelling in your throat  Drink liquids as directed  You may need to drink more liquids than usual  Liquids may help soothe your throat and prevent dehydration  Ask how much liquid to drink each day and which liquids are best for you  Use a cool-steam humidifier  to help moisten the air in your room and calm your cough  Soothe your throat  with cough drops, ice, soft foods, or popsicles  Prevent the spread of pharyngitis:  Cover your mouth and nose when you cough or sneeze  Do not share food or drinks  Wash your hands often  Use soap and water  If soap and water are unavailable, use an alcohol based hand   Follow up with your doctor as directed:  Write down your questions so you remember to ask them during your visits  © Copyright [employer] 2022 Information is for End User's use only and may not be sold, redistributed or otherwise used for commercial purposes  All illustrations and images included in CareNotes® are the copyrighted property of A D [employer] M [employer] Inc  or [employer]   The above information is an  only  It is not intended as medical advice for individual conditions or treatments  Talk to your doctor, nurse or pharmacist before following any medical regimen to see if it is safe and effective for you

## 2022-09-30 NOTE — PROGRESS NOTES
[address] [facility] [facility]        NAME: Ronak Gupta is a 33 y o  male  : 1995    MRN: [id number]  DATE: 2022  TIME: 9:02 AM    Assessment and Plan   Acute pharyngitis, unspecified etiology [J02 9]  1  Acute pharyngitis, unspecified etiology  POCT rapid strepA    amoxicillin (AMOXIL) 500 mg capsule   2  Bilateral impacted cerumen  Ear cerumen removal     Mildly erythematous bilateral ear canals following cerumen removal, however TMs not bulging or injected  Rapid point of care strep testing negative  Throat culture deferred as patient prescribed amoxicillin  Continue supportive measures  Follow-up with PCP if no symptom improvement with use of antibiotic  Patient verbalized understanding of instructions given  Patient Instructions     Patient Instructions   Ear lavage performed in office today for cerumen impaction   Rapid point of care strep testing negative  Take antibiotic as prescribed   Continue with supportive measures, OTC Tylenol/Ibuprofen, cool mist humidifiers, throat lozenges, honey, increased fluid intake and rest   Follow up with PCP in 3-5 days  Present to ER if symptoms worsen     Pharyngitis   AMBULATORY CARE:   Pharyngitis , or sore throat, is inflammation of the tissues and structures in your pharynx (throat)  Pharyngitis is most often caused by bacteria  It may also be caused by a cold or flu virus  Other causes include smoking, allergies, or acid reflux     Signs and symptoms that may occur with pharyngitis:   · Sore throat or pain when you swallow    · Fever, chills, and body aches    · Hoarse or raspy voice    · Cough, runny or stuffy nose, itchy or watery eyes    · Headache    · Upset stomach and loss of appetite    · Mild neck stiffness    · Swollen glands that feel like hard lumps when you touch your neck    · White and yellow pus-filled blisters in the back of your throat    Call 911 for any of the following:   · You have trouble breathing or swallowing because your throat is swollen or sore  Seek care immediately if:   · You are drooling because it hurts too much to swallow  · Your fever is higher than 102? F (39?C) or lasts longer than 3 days  · You are confused  · You taste blood in your throat  Contact your healthcare provider if:   · Your throat pain gets worse  · You have a painful lump in your throat that does not go away after 5 days  · Your symptoms do not improve after 5 days  · You have questions or concerns about your condition or care  Treatment for pharyngitis:  Viral pharyngitis will go away on its own without treatment  Your sore throat should start to feel better in 3 to 5 days for both viral and bacterial infections  You may need any of the following:  · Antibiotics  treat a bacterial infection  · NSAIDs , such as ibuprofen, help decrease swelling, pain, and fever  NSAIDs can cause stomach bleeding or kidney problems in certain people  If you take blood thinner medicine, always ask your healthcare provider if NSAIDs are safe for you  Always read the medicine label and follow directions  · Acetaminophen  decreases pain and fever  It is available without a doctor's order  Ask how much to take and how often to take it  Follow directions  Acetaminophen can cause liver damage if not taken correctly  Manage your symptoms:   · Gargle salt water  Mix ¼ teaspoon salt in an 8 ounce glass of warm water and gargle  This may help decrease swelling in your throat  · Drink liquids as directed  You may need to drink more liquids than usual  Liquids may help soothe your throat and prevent dehydration  Ask how much liquid to drink each day and which liquids are best for you  · Use a cool-steam humidifier  to help moisten the air in your room and calm your cough  · Soothe your throat  with cough drops, ice, soft foods, or popsicles  Prevent the spread of pharyngitis:  Cover your mouth and nose when you cough or sneeze   Do not share food or drinks  Wash your hands often  Use soap and water  If soap and water are unavailable, use an alcohol based hand   Follow up with your doctor as directed:  Write down your questions so you remember to ask them during your visits  © Copyright [employer] 2022 Information is for End User's use only and may not be sold, redistributed or otherwise used for commercial purposes  All illustrations and images included in CareNotes® are the copyrighted property of A D A M , Inc  or [facility] [employer]   The above information is an  only  It is not intended as medical advice for individual conditions or treatments  Talk to your doctor, nurse or pharmacist before following any medical regimen to see if it is safe and effective for you  Chief Complaint     Chief Complaint   Patient presents with    Cold Like Symptoms     Patient c/o bilateral ear pain, headache, neck pain and sore throat x 1-2 weeks          History of Present Illness       78-year-old male presents with multiple complaints  He endorses headache, neck pain, body aches, fever, vomiting, sore throat, and congestion x 1 5 weeks  He reports some improvement of symptoms but continues with headache, sore throat, bilateral ear pain, and neck pain  He has had multiple negative at home COVID tests, most recently on Saturday  He has only been taking Advil for his symptoms  Review of Systems   Review of Systems   Constitutional: Positive for fever  HENT: Positive for congestion, ear pain, postnasal drip and sore throat  Eyes: Positive for itching  Negative for discharge  Respiratory: Negative for shortness of breath  Cardiovascular: Negative for chest pain  Gastrointestinal: Positive for vomiting  Negative for abdominal pain  Musculoskeletal: Positive for myalgias and neck pain  Neurological: Positive for headaches           Current Medications       Current Outpatient Medications:    amoxicillin (AMOXIL) 500 mg capsule, Take 1 capsule (500 mg total) by mouth every 12 (twelve) hours for 10 days, Disp: 20 capsule, Rfl: 0    multivitamin (THERAGRAN) TABS, Take 1 tablet by mouth daily, Disp: , Rfl:     pseudoephedrine (SUDAFED) 120 MG 12 hr tablet, Take 1 tablet (120 mg total) by mouth 2 (two) times a day (Patient not taking: No sig reported), Disp: 20 tablet, Rfl: 0    sertraline (Zoloft) 50 mg tablet, Take 1 tablet (50 mg total) by mouth daily, Disp: 30 tablet, Rfl: 5    Current Allergies     Allergies as of 09/30/2022    (No Known Allergies)            The following portions of the patient's history were reviewed and updated as appropriate: allergies, current medications, past family history, past medical history, past social history, past surgical history and problem list      History reviewed  No pertinent past medical history  History reviewed  No pertinent surgical history  Family History   Problem Relation Age of Onset    Diabetes Maternal Grandmother     Heart disease Maternal Grandfather          Medications have been verified  Objective   /81   Pulse 83   Temp 98 1 °F (36 7 °C)   Resp 18   SpO2 99%   No LMP for male patient  Physical Exam     Physical Exam  Vitals and nursing note reviewed  Constitutional:       General: He is not in acute distress  HENT:      Head: Normocephalic  Right Ear: External ear normal  There is impacted cerumen  Left Ear: External ear normal  There is impacted cerumen  Nose: Nose normal       Right Sinus: No maxillary sinus tenderness or frontal sinus tenderness  Left Sinus: No maxillary sinus tenderness or frontal sinus tenderness  Mouth/Throat:      Pharynx: Posterior oropharyngeal erythema present  Tonsils: No tonsillar exudate  Comments: Mucoid drainage in posterior pharynx   Eyes:      Conjunctiva/sclera:      Right eye: Right conjunctiva is injected        Left eye: Left conjunctiva is injected  Cardiovascular:      Rate and Rhythm: Normal rate and regular rhythm  Heart sounds: Normal heart sounds  Pulmonary:      Effort: Pulmonary effort is normal  No respiratory distress  Breath sounds: Normal breath sounds  Abdominal:      General: Bowel sounds are normal       Palpations: Abdomen is soft  Tenderness: There is no abdominal tenderness  Musculoskeletal:      Cervical back: No rigidity  No spinous process tenderness or muscular tenderness  Lymphadenopathy:      Cervical: Cervical adenopathy present  Skin:     General: Skin is warm and dry  Neurological:      Mental Status: He is alert and oriented to person, place, and time     Psychiatric:         Mood and Affect: Mood normal          Behavior: Behavior normal

## 2022-11-14 ENCOUNTER — TELEPHONE (OUTPATIENT)
Dept: FAMILY MEDICINE CLINIC | Facility: CLINIC | Age: 27
End: 2022-11-14

## 2022-11-14 DIAGNOSIS — F41.1 GAD (GENERALIZED ANXIETY DISORDER): ICD-10-CM

## 2022-11-14 RX ORDER — SERTRALINE HYDROCHLORIDE 100 MG/1
100 TABLET, FILM COATED ORAL DAILY
Qty: 30 TABLET | Refills: 1 | Status: SHIPPED | OUTPATIENT
Start: 2022-11-14

## 2022-11-14 NOTE — TELEPHONE ENCOUNTER
----- Message from Gutierrez Albright sent at 11/14/2022  3:43 PM EST -----  Regarding: FW: Sertraline Dosage    ----- Message -----  From: Landy Her  Sent: 11/14/2022   3:10 PM EST  To: [facility]  Subject: Sertraline Dosage                                Hi PA Cheri Santillan now been using the 50mg of Sertraline for a few months and wanted to follow up  I feel that it is working a little bit, but not as much as I was hoping  My anxiety in certain situations/social situations is still pretty harsh and I was wondering what your thoughts are on if we should up the dosage or keep trying it at the current dosage? Thank you for your help!     Landy Her

## 2022-11-14 NOTE — TELEPHONE ENCOUNTER
I will send in prescription for 100 mg daily  I would recommend he follow-up in the next 4 weeks to re-evaluate progress

## 2022-11-29 ENCOUNTER — APPOINTMENT (OUTPATIENT)
Dept: LAB | Facility: HOSPITAL | Age: 27
End: 2022-11-29

## 2022-11-29 LAB
ALBUMIN SERPL BCP-MCNC: 4.5 G/DL (ref 3.5–5)
ALP SERPL-CCNC: 96 U/L (ref 46–116)
ALT SERPL W P-5'-P-CCNC: 28 U/L (ref 12–78)
ANION GAP SERPL CALCULATED.3IONS-SCNC: 7 MMOL/L (ref 4–13)
AST SERPL W P-5'-P-CCNC: 21 U/L (ref 5–45)
BASOPHILS # BLD AUTO: 0.05 THOUSANDS/ÂΜL (ref 0–0.1)
BASOPHILS NFR BLD AUTO: 1 % (ref 0–1)
BILIRUB SERPL-MCNC: 0.48 MG/DL (ref 0.2–1)
BUN SERPL-MCNC: 17 MG/DL (ref 5–25)
CALCIUM SERPL-MCNC: 9.4 MG/DL (ref 8.3–10.1)
CHLORIDE SERPL-SCNC: 105 MMOL/L (ref 96–108)
CHOLEST SERPL-MCNC: 133 MG/DL
CO2 SERPL-SCNC: 31 MMOL/L (ref 21–32)
CREAT SERPL-MCNC: 1.02 MG/DL (ref 0.6–1.3)
EOSINOPHIL # BLD AUTO: 0.15 THOUSAND/ÂΜL (ref 0–0.61)
EOSINOPHIL NFR BLD AUTO: 2 % (ref 0–6)
ERYTHROCYTE [DISTWIDTH] IN BLOOD BY AUTOMATED COUNT: 13 % (ref 11.6–15.1)
GFR SERPL CREATININE-BSD FRML MDRD: 100 ML/MIN/1.73SQ M
GLUCOSE P FAST SERPL-MCNC: 83 MG/DL (ref 65–99)
HCT VFR BLD AUTO: 47.1 % (ref 36.5–49.3)
HDLC SERPL-MCNC: 50 MG/DL
HGB BLD-MCNC: 15.5 G/DL (ref 12–17)
IMM GRANULOCYTES # BLD AUTO: 0.01 THOUSAND/UL (ref 0–0.2)
IMM GRANULOCYTES NFR BLD AUTO: 0 % (ref 0–2)
LDLC SERPL CALC-MCNC: 75 MG/DL (ref 0–100)
LYMPHOCYTES # BLD AUTO: 2.4 THOUSANDS/ÂΜL (ref 0.6–4.47)
LYMPHOCYTES NFR BLD AUTO: 35 % (ref 14–44)
MCH RBC QN AUTO: 28.3 PG (ref 26.8–34.3)
MCHC RBC AUTO-ENTMCNC: 32.9 G/DL (ref 31.4–37.4)
MCV RBC AUTO: 86 FL (ref 82–98)
MONOCYTES # BLD AUTO: 0.38 THOUSAND/ÂΜL (ref 0.17–1.22)
MONOCYTES NFR BLD AUTO: 6 % (ref 4–12)
NEUTROPHILS # BLD AUTO: 3.83 THOUSANDS/ÂΜL (ref 1.85–7.62)
NEUTS SEG NFR BLD AUTO: 56 % (ref 43–75)
NRBC BLD AUTO-RTO: 0 /100 WBCS
PLATELET # BLD AUTO: 166 THOUSANDS/UL (ref 149–390)
PMV BLD AUTO: 10.7 FL (ref 8.9–12.7)
POTASSIUM SERPL-SCNC: 4.4 MMOL/L (ref 3.5–5.3)
PROT SERPL-MCNC: 7.8 G/DL (ref 6.4–8.4)
RBC # BLD AUTO: 5.48 MILLION/UL (ref 3.88–5.62)
SODIUM SERPL-SCNC: 143 MMOL/L (ref 135–147)
TRIGL SERPL-MCNC: 40 MG/DL
TSH SERPL DL<=0.05 MIU/L-ACNC: 1.8 UIU/ML (ref 0.45–4.5)
WBC # BLD AUTO: 6.82 THOUSAND/UL (ref 4.31–10.16)

## 2022-12-20 DIAGNOSIS — F41.1 GAD (GENERALIZED ANXIETY DISORDER): ICD-10-CM

## 2022-12-21 RX ORDER — SERTRALINE HYDROCHLORIDE 100 MG/1
100 TABLET, FILM COATED ORAL DAILY
Qty: 30 TABLET | Refills: 0 | Status: SHIPPED | OUTPATIENT
Start: 2022-12-21 | End: 2022-12-29 | Stop reason: SDUPTHER

## 2022-12-28 ENCOUNTER — TELEPHONE (OUTPATIENT)
Dept: FAMILY MEDICINE CLINIC | Facility: CLINIC | Age: 27
End: 2022-12-28

## 2022-12-28 NOTE — TELEPHONE ENCOUNTER
Patient is asking for a sertraline refill please  He made a f/u appt with [facility] for tomorrow at 3:40 pm (12/29)  Please let him know when done  Thank you

## 2022-12-29 ENCOUNTER — OFFICE VISIT (OUTPATIENT)
Dept: FAMILY MEDICINE CLINIC | Facility: CLINIC | Age: 27
End: 2022-12-29

## 2022-12-29 ENCOUNTER — HOSPITAL ENCOUNTER (OUTPATIENT)
Dept: ULTRASOUND IMAGING | Facility: HOSPITAL | Age: 27
Discharge: HOME | End: 2022-12-29

## 2022-12-29 VITALS
SYSTOLIC BLOOD PRESSURE: 110 MMHG | WEIGHT: 192.8 LBS | DIASTOLIC BLOOD PRESSURE: 80 MMHG | HEART RATE: 78 BPM | OXYGEN SATURATION: 98 % | HEIGHT: 71 IN | BODY MASS INDEX: 26.99 KG/M2

## 2022-12-29 DIAGNOSIS — F41.1 GAD (GENERALIZED ANXIETY DISORDER): Primary | ICD-10-CM

## 2022-12-29 DIAGNOSIS — F45.8 BRUXISM: ICD-10-CM

## 2022-12-29 DIAGNOSIS — N50.811 RIGHT TESTICULAR PAIN: ICD-10-CM

## 2022-12-29 DIAGNOSIS — R06.83 SNORING: ICD-10-CM

## 2022-12-29 DIAGNOSIS — R40.0 DAYTIME SOMNOLENCE: ICD-10-CM

## 2022-12-29 RX ORDER — SERTRALINE HYDROCHLORIDE 100 MG/1
100 TABLET, FILM COATED ORAL DAILY
Qty: 30 TABLET | Refills: 11 | Status: SHIPPED | OUTPATIENT
Start: 2022-12-29

## 2022-12-29 RX ORDER — COVID-19 ANTIGEN TEST
KIT MISCELLANEOUS
COMMUNITY
Start: 2022-11-01

## 2022-12-29 NOTE — PROGRESS NOTES
Name: Millie Ghosh      : 1995      MRN: [id number]  Encounter Provider: Kodak Patel PA-C  Encounter Date: 2022   Encounter department: [facility] PRIMARY CARE    Assessment & Plan     Patient Instructions   Assessment/plan:  1  Anxiety-presently stable in remission on Zoloft 100 mg daily  Recommend continuing long-term therapy  Patient tolerating medication well without side effects  Labs from November of this year were reviewed and within normal limits  2   Daytime somnolence, unrefreshing sleep, snoring, bruxism-recommend home sleep study to assess for apnea  If positive will have follow-up with sleep specialist to discuss appropriate treatment  3   BMI of 26-patient encouraged to continue healthy diet and exercise plan  He continues to enjoy weightlifting, preferred by him over cardiovascular exercise  1  DAFNE (generalized anxiety disorder)  -     sertraline (Zoloft) 100 mg tablet; Take 1 tablet (100 mg total) by mouth daily    2  Daytime somnolence  -     Home Study; Future    3  Snoring  -     Home Study; Future    4  Bruxism  -     Home Study; Future    5  BMI 26 0-26 9,adult         Subjective      HPI: This is a 58-year-old gentleman that presents to the office for follow-up of anxiety  He has been on Zoloft 100 mg daily for quite some time and feels like it continues to benefit him  He does feel that his symptoms are in remission and he denies any side effects of the medication  He does have 1 other question regarding his sleep  He has been known to grind his teeth at nighttime and has seen his dentist about TMJ  He also has symptoms of unrefreshing sleep, feeling difficulty getting going in the morning and has been snoring quite a bit  His dentist has questioned possible sleep apnea and recommended he have some study  He does continue to exercise on a regular basis and typically prefers weight training over cardiovascular exercise      Review of Systems Constitutional: Negative for chills, fatigue and fever  HENT: Negative for congestion, ear pain and sinus pressure  Eyes: Negative for visual disturbance  Respiratory: Negative for cough, chest tightness and shortness of breath  Cardiovascular: Negative for chest pain and palpitations  Gastrointestinal: Negative for diarrhea, nausea and vomiting  Endocrine: Negative for polyuria  Genitourinary: Negative for dysuria and frequency  Musculoskeletal: Negative for arthralgias and myalgias  Skin: Negative for pallor and rash  Neurological: Negative for dizziness, weakness, light-headedness, numbness and headaches  Psychiatric/Behavioral: Negative for agitation, behavioral problems and sleep disturbance  All other systems reviewed and are negative  Current Outpatient Medications on File Prior to Visit   Medication Sig   • multivitamin (THERAGRAN) TABS Take 1 tablet by mouth daily   • [DISCONTINUED] sertraline (Zoloft) 100 mg tablet Take 1 tablet (100 mg total) by mouth daily   • Flowflex COVID-19 Ag Home Test KIT FOLLOW INSTRUCTIONS INCLUDED WITH THE PACKAGE  • [DISCONTINUED] pseudoephedrine (SUDAFED) 120 MG 12 hr tablet Take 1 tablet (120 mg total) by mouth 2 (two) times a day (Patient not taking: Reported on 7/29/2022)       Objective     /80 (BP Location: Left arm, Patient Position: Sitting, Cuff Size: Standard)   Pulse 78   Ht 5' 11" (1 803 m)   Wt 87 5 kg (192 lb 12 8 oz)   SpO2 98%   BMI 26 89 kg/m²     Physical Exam  Vitals and nursing note reviewed  Constitutional:       General: He is not in acute distress  Appearance: He is well-developed  HENT:      Head: Normocephalic and atraumatic  Right Ear: External ear normal       Left Ear: External ear normal       Nose: Nose normal       Mouth/Throat:      Pharynx: No oropharyngeal exudate  Eyes:      Conjunctiva/sclera: Conjunctivae normal       Pupils: Pupils are equal, round, and reactive to light     Neck: Thyroid: No thyromegaly  Trachea: No tracheal deviation  Cardiovascular:      Rate and Rhythm: Normal rate and regular rhythm  Heart sounds: Normal heart sounds  No murmur heard  No friction rub  Pulmonary:      Effort: Pulmonary effort is normal  No respiratory distress  Breath sounds: Normal breath sounds  No wheezing or rales  Abdominal:      General: Bowel sounds are normal  There is no distension  Palpations: Abdomen is soft  Tenderness: There is no abdominal tenderness  There is no guarding or rebound  Musculoskeletal:         General: No tenderness  Normal range of motion  Cervical back: Normal range of motion and neck supple  Lymphadenopathy:      Cervical: No cervical adenopathy  Skin:     General: Skin is warm and dry  Findings: No erythema or rash  Neurological:      Mental Status: He is alert and oriented to person, place, and time  Cranial Nerves: No cranial nerve deficit  Coordination: Coordination normal    Psychiatric:         Behavior: Behavior normal          Thought Content:  Thought content normal        Natalie Young PA-C

## 2022-12-29 NOTE — PATIENT INSTRUCTIONS
Assessment/plan:  1  Anxiety-presently stable in remission on Zoloft 100 mg daily  Recommend continuing long-term therapy  Patient tolerating medication well without side effects  Labs from November of this year were reviewed and within normal limits  2   Daytime somnolence, unrefreshing sleep, snoring, bruxism-recommend home sleep study to assess for apnea  If positive will have follow-up with sleep specialist to discuss appropriate treatment  3   BMI of 26-patient encouraged to continue healthy diet and exercise plan  He continues to enjoy weightlifting, preferred by him over cardiovascular exercise

## 2023-11-08 ENCOUNTER — TELEPHONE (OUTPATIENT)
Dept: FAMILY MEDICINE CLINIC | Facility: CLINIC | Age: 28
End: 2023-11-08

## 2023-11-08 ENCOUNTER — PATIENT MESSAGE (OUTPATIENT)
Dept: FAMILY MEDICINE CLINIC | Facility: CLINIC | Age: 28
End: 2023-11-08

## 2023-11-08 DIAGNOSIS — D22.9 MULTIPLE NEVI: ICD-10-CM

## 2023-11-08 DIAGNOSIS — F45.8 BRUXISM: ICD-10-CM

## 2023-11-08 DIAGNOSIS — Z31.69 INFERTILITY COUNSELING: Primary | ICD-10-CM

## 2023-11-08 DIAGNOSIS — R40.0 DAYTIME SOMNOLENCE: Primary | ICD-10-CM

## 2023-11-08 DIAGNOSIS — R06.83 SNORING: ICD-10-CM

## 2023-11-08 DIAGNOSIS — S03.40XS TMJ (SPRAIN OF TEMPOROMANDIBULAR JOINT), SEQUELA: ICD-10-CM

## 2023-11-08 NOTE — TELEPHONE ENCOUNTER
----- Message from Franny Corey sent at 11/8/2023 11:32 AM EST -----  Regarding: FW: Scheduling Ext  Contact: [phone]  Please review and advise. Thank you.  ----- Message -----  From: Sonny Calhoun  Sent: 11/8/2023   8:56 AM EST  To: [facility]  Subject: Scheduling Ext                                   Hello, I had a visit last December and got some referrals and scheduling tickets for appointments. Due to moving and some other life events, I have not gotten the chance to do these yet. Would I be able to get my Home Study scheduling period extended so I can book an appointment for early 2024? Also, I had referrals for a dermatologist appointment to look at some moles and I believe we discussed seeing an oral surgeon for X-rays regarding my TMJ. Would I be able to get updated copies of these referrals as well so I can schedule for early 2024? Thank you!

## 2023-11-08 NOTE — TELEPHONE ENCOUNTER
Updated referrals for home study, oral surgery, dermatology placed. Patient can schedule well visit in January.

## 2023-11-08 NOTE — PROGRESS NOTES
Order placed only no review of chart PHYSICAL THERAPY Daily Note    Referring Provider:  Dr. Solomon Lujan    Diagnosis:       ICD-10-CM ICD-9-CM    1. Status post total shoulder arthroplasty, left Z96.612 V43.61             Orders:  Evaluate and Treat    Date of Initial Evaluation: 10-24-18      Visit # 8    SUBJECTIVE:  Pt. Reports having some stiffness and soreness after using UBE last visit.      Current Pain: not reported today    OBJECTIVE:      ASSESSMENT:  Pt. Still has about 50% L shoulder flexion and abduction actively.  Pt.'s L shoulder ext. Rotation is about 80% actively and passively.   Mild to moderate muscle tension noted L prox. Biceps tendon with no resolve with STM.   Pt. Reports having accupuncture this morning and felt a little different after performing a few exercises so she decided to end treatment today.        TREATMENT PROVIDED:  -Manual Therapy:  L shoulder PROM  X 5 min., STM L prox. Biceps x 5 min.  -Therapeutic Exercise:    One on one 13min.:    int. Rot stretch with belt x 2 min., middle trap x 2 min., lower trap x 2min., AROM x 5 min, scap retraction x 2 min.  -Modalities: N/A     PLAN:  Patient will benefit from physical therapy (2-3) x/week for (10-11) weeks including manual therapy, therapeutic exercise, functional activities, modalities, and patient education.    Thank you for this referral.    These services are reasonable and necessary for the conditions set forth above while under my care.

## 2023-11-09 ENCOUNTER — TELEPHONE (OUTPATIENT)
Dept: SLEEP CENTER | Facility: CLINIC | Age: 28
End: 2023-11-09

## 2023-11-09 NOTE — TELEPHONE ENCOUNTER
----- Message from Vandana Jaquez MD sent at 11/9/2023 12:56 PM EST -----  approved  ----- Message -----  From: Karina Varela  Sent: 87/5/6108  10:54 AM EST  To: Sleep Medicine [facility] Provider    This home sleep study needs approval.     If approved please sign and return to clerical pool. If denied please include reasons why. Also provide alternative testing if warranted. Please sign and return to clerical pool.

## 2023-12-07 ENCOUNTER — TELEPHONE (OUTPATIENT)
Dept: DERMATOLOGY | Facility: CLINIC | Age: 28
End: 2023-12-07

## 2023-12-07 NOTE — TELEPHONE ENCOUNTER
Called patient to advise his upcoming appt with Dr. Dayan Monge on 1/18 needs to be r/s, due to a change in provider's schedule. Left message that his appt has been moved a week sooner to Thursday, 1/11. Same time, same location. Advised to call the office if he needs to reschedule.

## 2023-12-08 ENCOUNTER — OFFICE VISIT (OUTPATIENT)
Dept: URGENT CARE | Facility: MEDICAL CENTER | Age: 28
End: 2023-12-08
Payer: COMMERCIAL

## 2023-12-08 VITALS
TEMPERATURE: 97.8 F | HEIGHT: 71 IN | DIASTOLIC BLOOD PRESSURE: 80 MMHG | OXYGEN SATURATION: 98 % | BODY MASS INDEX: 28 KG/M2 | SYSTOLIC BLOOD PRESSURE: 130 MMHG | WEIGHT: 200 LBS | HEART RATE: 83 BPM | RESPIRATION RATE: 18 BRPM

## 2023-12-08 DIAGNOSIS — H66.91 ACUTE RIGHT OTITIS MEDIA: Primary | ICD-10-CM

## 2023-12-08 PROCEDURE — 99213 OFFICE O/P EST LOW 20 MIN: CPT

## 2023-12-08 RX ORDER — OFLOXACIN 3 MG/ML
10 SOLUTION AURICULAR (OTIC) DAILY
Qty: 3.5 ML | Refills: 0 | Status: SHIPPED | OUTPATIENT
Start: 2023-12-08 | End: 2023-12-15

## 2023-12-08 RX ORDER — AMOXICILLIN AND CLAVULANATE POTASSIUM 875; 125 MG/1; MG/1
1 TABLET, FILM COATED ORAL EVERY 12 HOURS SCHEDULED
Qty: 20 TABLET | Refills: 0 | Status: SHIPPED | OUTPATIENT
Start: 2023-12-08 | End: 2023-12-18

## 2023-12-08 NOTE — PATIENT INSTRUCTIONS
Take antibiotic as prescribed. Recommend probiotic use while taking antibiotic. Use antibiotic ear drops as directed. Acetaminophen or ibuprofen for pain. Follow-up with PCP in 3-5 days. Go to ER if symptoms worsen.

## 2023-12-08 NOTE — PROGRESS NOTES
[facility]        NAME: Tigre Black is a 33 y.o. male  : 1995    MRN: [id number]  DATE: 2023  TIME: 10:01 AM      Assessment and Plan     Acute right otitis media [H66.91]  1. Acute right otitis media  amoxicillin-clavulanate (AUGMENTIN) 875-125 mg per tablet    ofloxacin (FLOXIN) 0.3 % otic solution        Unable to visualize if there is a perforation to right eardrum given swelling and tenderness. Will treat for perforation given drainage in the right ear canal    Patient Instructions   Take antibiotic as prescribed. Recommend probiotic use while taking antibiotic. Use antibiotic ear drops as directed. Acetaminophen or ibuprofen for pain. Follow-up with PCP in 3-5 days. Go to ER if symptoms worsen. Chief Complaint     Chief Complaint   Patient presents with    Earache     Pt presents with right ear pain x 3 days. No fevers. He has been using advil for symptom relief. Pt also reports yellow discharge out of the same ear. History of Present Illness     Patient is a 56-year-old male who presents with right ear pain. Reports yellow drainage from the ear. Reports decreased hearing. Denies known injury or trauma. Denies fever. Denies headaches. Reports he did have tubes in the ears as a child. Earache   Associated symptoms include ear discharge and hearing loss. Pertinent negatives include no diarrhea, headaches or vomiting. Review of Systems     Review of Systems   Constitutional:  Negative for chills and fever. HENT:  Positive for ear discharge, ear pain and hearing loss. Gastrointestinal:  Negative for diarrhea and vomiting. Neurological:  Negative for headaches. All other systems reviewed and are negative.         Current Medications       Current Outpatient Medications:     amoxicillin-clavulanate (AUGMENTIN) 875-125 mg per tablet, Take 1 tablet by mouth every 12 (twelve) hours for 10 days, Disp: 20 tablet, Rfl: 0    multivitamin ([place]) Include Z78.9 (Other Specified Conditions Influencing Health Status) As An Associated Diagnosis?: No TABS, Take 1 tablet by mouth daily, Disp: , Rfl:     ofloxacin (FLOXIN) 0.3 % otic solution, Administer 10 drops to the right ear daily for 7 days, Disp: 3.5 mL, Rfl: 0    sertraline (Zoloft) 100 mg tablet, Take 1 tablet (100 mg total) by mouth daily, Disp: 30 tablet, Rfl: 11    Flowflex COVID-19 Ag Home Test KIT, FOLLOW INSTRUCTIONS INCLUDED WITH THE PACKAGE. (Patient not taking: Reported on 12/8/2023), Disp: , Rfl:     Current Allergies     Allergies as of 12/08/2023    (No Known Allergies)              The following portions of the patient's history were reviewed and updated as appropriate: allergies, current medications, past family history, past medical history, past social history, past surgical history and problem list.     History reviewed. No pertinent past medical history. History reviewed. No pertinent surgical history. Family History   Problem Relation Age of Onset    Diabetes Maternal Grandmother     Heart disease Maternal Grandfather          Medications have been verified. Objective     /80   Pulse 83   Temp 97.8 °F (36.6 °C)   Resp 18   Ht 5' 11" (1.803 m)   Wt 90.7 kg (200 lb)   SpO2 98%   BMI 27.89 kg/m²   No LMP for male patient. Physical Exam     Physical Exam  Vitals and nursing note reviewed. Constitutional:       General: He is not in acute distress. Appearance: Normal appearance. He is not ill-appearing, toxic-appearing or diaphoretic. HENT:      Right Ear: Drainage, swelling (With localized erythema to right ear canal) and tenderness present. Tympanic membrane is erythematous. Left Ear: No drainage, swelling or tenderness. Tympanic membrane is not perforated or erythematous. Cardiovascular:      Rate and Rhythm: Normal rate. Pulses: Normal pulses. Heart sounds: Normal heart sounds, S1 normal and S2 normal.   Pulmonary:      Effort: Pulmonary effort is normal.      Breath sounds: Normal breath sounds and air entry.  No stridor, Medical Necessity Clause: This procedure was medically necessary because the lesions that were treated were: decreased air movement or transmitted upper airway sounds. No decreased breath sounds, wheezing, rhonchi or rales. Skin:     General: Skin is warm. Capillary Refill: Capillary refill takes less than 2 seconds. Neurological:      General: No focal deficit present. Mental Status: He is alert. Psychiatric:         Mood and Affect: Mood normal.         Behavior: Behavior normal.         Thought Content:  Thought content normal.         Judgment: Judgment normal. Kenalog Preparation: Kenalog Consent: The risks of atrophy were reviewed with the patient. Administered By (Optional): Percy Wagner Total Volume (Ccs): 2 X Size Of Lesion In Cm (Optional): 0 Detail Level: Detailed Concentration Of Kenalog Solution Injected (Mg/Ml): 0.5

## 2024-01-05 ENCOUNTER — HOSPITAL ENCOUNTER (OUTPATIENT)
Dept: SLEEP CENTER | Facility: CLINIC | Age: 29
Discharge: HOME | End: 2024-01-05
Payer: COMMERCIAL

## 2024-01-05 DIAGNOSIS — R06.83 SNORING: ICD-10-CM

## 2024-01-05 DIAGNOSIS — R40.0 DAYTIME SOMNOLENCE: ICD-10-CM

## 2024-01-05 DIAGNOSIS — F45.8 BRUXISM: ICD-10-CM

## 2024-01-05 PROCEDURE — G0399 HOME SLEEP TEST/TYPE 3 PORTA: HCPCS

## 2024-01-06 NOTE — PROGRESS NOTES
Home Sleep Study Documentation    HOME STUDY DEVICE: Noxturnal yes                                           Na G3 no      Pre-Sleep Home Study:    Set-up and instructions performed by:  CUBA Bee    Technician performed demonstration for Patient: yes    Return demonstration performed by Patient: yes    Written instructions provided to Patient: yes    Patient signed consent form: yes        Post-Sleep Home Study:    Additional comments by Patient: None    Home Sleep Study Failed:no:    Failure reason: N/A    Reported or Detected: N/A    Scored by: CUBA Christianson

## 2024-01-08 ENCOUNTER — PATIENT MESSAGE (OUTPATIENT)
Dept: FAMILY MEDICINE CLINIC | Facility: CLINIC | Age: 29
End: 2024-01-08

## 2024-01-08 DIAGNOSIS — F41.1 GAD (GENERALIZED ANXIETY DISORDER): ICD-10-CM

## 2024-01-08 PROBLEM — R40.0 DAYTIME SOMNOLENCE: Status: ACTIVE | Noted: 2024-01-08

## 2024-01-08 PROBLEM — R06.83 SNORING: Status: ACTIVE | Noted: 2024-01-08

## 2024-01-08 PROBLEM — G47.33 OBSTRUCTIVE SLEEP APNEA: Status: ACTIVE | Noted: 2024-01-08

## 2024-01-08 RX ORDER — SERTRALINE HYDROCHLORIDE 100 MG/1
100 TABLET, FILM COATED ORAL DAILY
Qty: 30 TABLET | Refills: 0 | Status: SHIPPED | OUTPATIENT
Start: 2024-01-08

## 2024-01-11 ENCOUNTER — OFFICE VISIT (OUTPATIENT)
Dept: DERMATOLOGY | Facility: CLINIC | Age: 29
End: 2024-01-11

## 2024-01-11 VITALS — WEIGHT: 190 LBS | BODY MASS INDEX: 26.6 KG/M2 | TEMPERATURE: 97.5 F | HEIGHT: 71 IN

## 2024-01-11 DIAGNOSIS — D48.5 NEOPLASM OF UNCERTAIN BEHAVIOR OF SKIN: Primary | ICD-10-CM

## 2024-01-11 DIAGNOSIS — D22.9 MULTIPLE NEVI: ICD-10-CM

## 2024-01-11 PROCEDURE — 88341 IMHCHEM/IMCYTCHM EA ADD ANTB: CPT | Performed by: DERMATOLOGY

## 2024-01-11 PROCEDURE — 88305 TISSUE EXAM BY PATHOLOGIST: CPT | Performed by: DERMATOLOGY

## 2024-01-11 PROCEDURE — 88342 IMHCHEM/IMCYTCHM 1ST ANTB: CPT | Performed by: DERMATOLOGY

## 2024-01-11 NOTE — PROGRESS NOTES
"[facility] Dermatology Clinic Note     Patient Name: Addy Trotter  Encounter Date: 1/11/2024     Have you been cared for by a [facility] Dermatologist in the last 3 years and, if so, which description applies to you?    NO.   I am considered a \"new\" patient and must complete all patient intake questions. I am MALE/not capable of bearing children.    REVIEW OF SYSTEMS:  Have you recently had or currently have any of the following? Recent fever or chills? No  Any non-healing wound? No   PAST MEDICAL HISTORY:  Have you personally ever had or currently have any of the following?  If \"YES,\" then please provide more detail. Skin cancer (such as Melanoma, Basal Cell Carcinoma, Squamous Cell Carcinoma?  No  Tuberculosis, HIV/AIDS, Hepatitis B or C: No  Radiation Treatment No   HISTORY OF IMMUNOSUPPRESSION:   Do you have a history of any of the following:  Systemic Immunosuppression such as Diabetes, Biologic or Immunotherapy, Chemotherapy, Organ Transplantation, Bone Marrow Transplantation?  No     Answering \"YES\" requires the addition of the dotphrase \"IMMUNOSUPPRESSED\" as the first diagnosis of the patient's visit.   FAMILY HISTORY:  Any \"first degree relatives\" (parent, brother, sister, or child) with the following?    Skin Cancer, Pancreatic or Other Cancer? YES, Sister had breast cancer   PATIENT EXPERIENCE:    Do you want the Dermatologist to perform a COMPLETE skin exam today including a clinical examination under the \"bra and underwear\" areas?  NO   If necessary, do we have your permission to call and leave a detailed message on your Preferred Phone number that includes your specific medical information?  Yes      No Known Allergies   Current Outpatient Medications:     sertraline (Zoloft) 100 mg tablet, Take 1 tablet (100 mg total) by mouth daily, Disp: 30 tablet, Rfl: 0    Flowflex COVID-19 Ag Home Test KIT, FOLLOW INSTRUCTIONS INCLUDED WITH THE PACKAGE. (Patient not taking: Reported on 12/8/2023), Disp: , Rfl:     " "multivitamin (THERAGRAN) TABS, Take 1 tablet by mouth daily, Disp: , Rfl:           Whom besides the patient is providing clinical information about today's encounter?   NO ADDITIONAL HISTORIAN (patient alone provided history)    Physical Exam and Assessment/Plan by Diagnosis:        MELANOCYTIC NEVI  -Relevant exam: Scattered over the trunk/extremities, right axilla are homogenously pigmented brown macules and papules. ELM performed and without concerning findings. No outliers unless otherwise noted in today's note  - Exam and clinical history consistent with melanocytic nevi  - Educated on the ABCDE's of melanoma; handout provided  - Counseled to return to clinic prior to scheduled appointment should any of these lesions change or should any new lesions of concern arise  - Counseled on use of sun protection daily. Reviewed latest FDA sunscreen guidelines, including use of broad spectrum (UVA and UVB blocking) sunscreen or sun protective clothing with SPF 30-50 every 2-3 hours and reapplied after exposure to water; use of photoprotective clothing, including a broad brim hat and UPF rated clothing if outdoors for several hours; avoid use of tanning beds as these pose significant risk for melanoma and skin cancer.      NEOPLASM OF UNCERTAIN BEHAVIOR OF SKIN    Physical Exam:  (Anatomic Location); (Size and Morphological Description); (Differential Diagnosis):  Specimen A; Left upper back; 1.1 x 0.8 cm irregularly pigmented papule. Differential diagnosis: benign vs atypical nevus; rule out melanoma     Specimen B;central chest 0.7 x 0.5 cm irregularly pigmented papule. Differential diagnosis: benign vs atypical nevus; rule out melanoma     Pertinent Positives: Both sites enlarging.  Pertinent Negatives:    Additional History of Present Condition:  Present on exam     Assessment and Plan:  I have discussed with the patient that a sample of skin via a \"skin biopsy” would be potentially helpful to further make a specific " diagnosis under the microscope.  Based on a thorough discussion of this condition and the management approach to it (including a comprehensive discussion of the known risks, side effects and potential benefits of treatment), the patient (family) agrees to implement the following specific plan:    Procedure:  Skin Biopsy.  After a thorough discussion of treatment options and risk/benefits/alternatives (including but not limited to local pain, scarring, dyspigmentation, blistering, possible superinfection, and inability to confirm a diagnosis via histopathology), verbal and written consent were obtained and portion of the rash was biopsied for tissue sample.  See below for consent that was obtained from patient and subsequent Procedure Note.     PROCEDURE TANGENTIAL (SHAVE) BIOPSY NOTE:    Performing Physician:   Anatomic Location; Clinical Description with size (cm); Pre-Op Diagnosis:   Specimen A; Left upper back; 1.1 x 0.8 cm irregularly pigmented papule. Differential diagnosis: benign vs atypical nevus; rule out melanoma     Specimen B;central chest 0.7 x 0.5 cm irregularly pigmented papule. Differential diagnosis: benign vs atypical nevus; rule out melanoma   Post-op diagnosis: Same     Local anesthesia: 1% xylocaine with epi      Topical anesthesia: None    Hemostasis: Aluminum chloride       After obtaining informed consent  at which time there was a discussion about the purpose of biopsy  and low risks of infection and bleeding.  The area was prepped and draped in the usual fashion. Anesthesia was obtained with 1% lidocaine with epinephrine. A shave biopsy to an appropriate sampling depth was obtained by Shave (Dermablade or 15 blade) The resulting wound was covered with surgical ointment and bandaged appropriately.     The patient tolerated the procedure well without complications and was without signs of functional compromise.      Specimen has been sent for review by Dermatopathology.    Standard  "post-procedure care has been explained and has been included in written form within the patient's copy of Informed Consent.    INFORMED CONSENT DISCUSSION AND POST-OPERATIVE INSTRUCTIONS FOR PATIENT    I.  RATIONALE FOR PROCEDURE  I understand that a skin biopsy allows the Dermatologist to test a lesion or rash under the microscope to obtain a diagnosis.  It usually involves numbing the area with numbing medication and removing a small piece of skin; sometimes the area will be closed with sutures. In this specific procedure, sutures are not usually needed.  If any sutures are placed, then they are usually need to be removed in 2 weeks or less.    I understand that my Dermatologist recommends that a skin \"shave\" biopsy be performed today.  A local anesthetic, similar to the kind that a dentist uses when filling a cavity, will be injected with a very small needle into the skin area to be sampled.  The injected skin and tissue underneath \"will go to sleep” and become numb so no pain should be felt afterwards.  An instrument shaped like a tiny \"razor blade\" (shave biopsy instrument) will be used to cut a small piece of tissue and skin from the area so that a sample of tissue can be taken and examined more closely under the microscope.  A slight amount of bleeding will occur, but it will be stopped with direct pressure and a pressure bandage and any other appropriate methods.  I understands that a scar will form where the wound was created.  Surgical ointment will be applied to help protect the wound.  Sutures are not usually needed.    II.  RISKS AND POTENTIAL COMPLICATIONS   I understand the risks and potential complications of a skin biopsy include but are not limited to the following:  Bleeding  Infection  Pain  Scar/keloid  Skin discoloration  Incomplete Removal  Recurrence  Nerve Damage/Numbness/Loss of Function  Allergic Reaction to Anesthesia  Biopsies are diagnostic procedures and based on findings additional " "treatment or evaluation may be required  Loss or destruction of specimen resulting in no additional findings    My Dermatologist has explained to me the nature of the condition, the nature of the procedure, and the benefits to be reasonably expected compared with alternative approaches.  My Dermatologist has discussed the likelihood of major risks or complications of this procedure including the specific risks listed above, such as bleeding, infection, and scarring/keloid.  I understand that a scar is expected after this procedure.  I understand that my physician cannot predict if the scar will form a \"keloid,\" which extends beyond the borders of the wound that is created.  A keloid is a thick, painful, and bumpy scar.  A keloid can be difficult to treat, as it does not always respond well to therapy, which includes injecting cortisone directly into the keloid every few weeks.  While this usually reduces the pain and size of the scar, it does not eliminate it.      I understand that photographs may be taken before and after the procedure.  These will be maintained as part of the medical providers confidential records and may not be made available to me.  I further authorize the medical provider to use the photographs for teaching purposes or to illustrate scientific papers, books, or lectures if in his/her judgment, medical research, education, or science may benefit from its use.    I have had an opportunity to fully inquire about the risks and benefits of this procedure and its alternatives.   I have been given ample time and opportunity to ask questions and to seek a second opinion if I wished to do so.  I acknowledge that there have specifically been no guarantees as to the cosmetic results from the procedure.  I am aware that with any procedure there is always the possibility of an unexpected complication.    III. POST-PROCEDURAL CARE (WHAT YOU WILL NEED TO DO \"AFTER THE BIOPSY\" TO OPTIMIZE HEALING)    Keep the " "area clean and dry.  Try NOT to remove the bandage or get it wet for the first 24 hours.    Gently clean the area and apply surgical ointment (such as Vaseline petrolatum ointment, which is available \"over the counter\" and not a prescription) to the biopsy site for up to 2 weeks straight.  This acts to protect the wound from the outside world.      Sutures are not usually placed in this procedure.  If any sutures were placed, return for suture removal as instructed (generally 1 week for the face, 2 weeks for the body).      Take Acetaminophen (Tylenol) for discomfort, if no contraindications.  Ibuprofen or aspirin could make bleeding worse.    Call our office immediately for signs of infection: fever, chills, increased redness, warmth, tenderness, discomfort/pain, or pus or foul smell coming from the wound.    WHAT TO DO IF THERE IS ANY BLEEDING?  If a small amount of bleeding is noticed, place a clean cloth over the area and apply firm pressure for ten minutes.  Check the wound after 10 minutes of direct pressure.  If bleeding persists, try one more time for an additional 10 minutes of direct pressure on the area.  If the bleeding becomes heavier or does not stop after the second attempt, or if you have any other questions about this procedure, then please call your [facility] Dermatologist by calling [phone] (SKIN).     I hereby acknowledge that I have reviewed and verified the site with my Dermatologist and have requested and authorized my Dermatologist to proceed with the procedure.         Scribe Attestation      I,:  Randa Pop MA am acting as a scribe while in the presence of the attending physician.:       I,:  Sandie Oliveira MD personally performed the services described in this documentation    as scribed in my presence.:                "

## 2024-01-13 PROBLEM — G47.8 OTHER SLEEP DISORDERS: Status: ACTIVE | Noted: 2024-01-13

## 2024-01-13 PROCEDURE — G0399 HOME SLEEP TEST/TYPE 3 PORTA: HCPCS | Performed by: PSYCHIATRY & NEUROLOGY

## 2024-01-19 ENCOUNTER — APPOINTMENT (OUTPATIENT)
Dept: LAB | Facility: CLINIC | Age: 29
End: 2024-01-19
Payer: COMMERCIAL

## 2024-01-19 DIAGNOSIS — Z31.69 INFERTILITY COUNSELING: ICD-10-CM

## 2024-01-19 LAB
B ABORTUS AB SMN QL AGGL: SLIGHT
COLLECTION DATE & TIME: ABNORMAL
LIQUEFACTION TIME SMN: 30 MIN
PH SMN: 8.3 [PH] (ref 7.2–8.6)
SEX ABSTIN DURATION TIME PATIENT: 5 D
SPECIMEN VOL SMN: 2.3 ML (ref 1–5)
SPERM # SMN: 506 MILLION/EJACULATION (ref 40–1000)
SPERM AMORPHOUS HEAD NFR SMN: 4 %
SPERM MORPH PNL SMN: 17
SPERM MOTILE SMN QL MICRO: 87 %
SPERM MOTILITY SCORE SMN AUTO: 4 (ref 2–4)
SPERM PROG NFR SMN: 3 % (ref 2–4)
SPERM SMN: 0 %
SPERM SMN: 11 %
SPERM SMN: 17 %
SPERM SMN: 18 %
SPERM SMN: 2 %
SPERM SMN: 220 /ML (ref 20–999)
SPERM SMN: 3 %
SPERM SMN: 3 %
SPERM SMN: 35 % (ref 50–100)
SPERM SMN: 65 % (ref 0–50)
SPERM SMN: 7 %
VISC SMN: 4 CP (ref 3–4)
WBC SMN QL: <1 "HPF"

## 2024-01-19 PROCEDURE — 89320 SEMEN ANAL VOL/COUNT/MOT: CPT

## 2024-01-22 PROCEDURE — 88305 TISSUE EXAM BY PATHOLOGIST: CPT | Performed by: DERMATOLOGY

## 2024-01-22 PROCEDURE — 88342 IMHCHEM/IMCYTCHM 1ST ANTB: CPT | Performed by: DERMATOLOGY

## 2024-01-22 PROCEDURE — 88341 IMHCHEM/IMCYTCHM EA ADD ANTB: CPT | Performed by: DERMATOLOGY

## 2024-01-23 ENCOUNTER — TELEPHONE (OUTPATIENT)
Dept: DERMATOLOGY | Facility: CLINIC | Age: 29
End: 2024-01-23

## 2024-01-24 ENCOUNTER — TELEPHONE (OUTPATIENT)
Dept: DERMATOLOGY | Facility: CLINIC | Age: 29
End: 2024-01-24

## 2024-01-26 ENCOUNTER — TELEPHONE (OUTPATIENT)
Dept: DERMATOLOGY | Facility: CLINIC | Age: 29
End: 2024-01-26

## 2024-01-29 ENCOUNTER — OFFICE VISIT (OUTPATIENT)
Dept: FAMILY MEDICINE CLINIC | Facility: CLINIC | Age: 29
End: 2024-01-29
Payer: COMMERCIAL

## 2024-01-29 VITALS
HEART RATE: 91 BPM | HEIGHT: 71 IN | WEIGHT: 205 LBS | OXYGEN SATURATION: 96 % | BODY MASS INDEX: 28.7 KG/M2 | SYSTOLIC BLOOD PRESSURE: 118 MMHG | DIASTOLIC BLOOD PRESSURE: 76 MMHG

## 2024-01-29 DIAGNOSIS — F41.1 GAD (GENERALIZED ANXIETY DISORDER): ICD-10-CM

## 2024-01-29 DIAGNOSIS — Z00.00 HEALTH CARE MAINTENANCE: Primary | ICD-10-CM

## 2024-01-29 DIAGNOSIS — D22.5 MELANOCYTIC NEVUS OF TRUNK: ICD-10-CM

## 2024-01-29 PROBLEM — G47.8 OTHER SLEEP DISORDERS: Status: RESOLVED | Noted: 2024-01-13 | Resolved: 2024-01-29

## 2024-01-29 PROBLEM — G47.33 OBSTRUCTIVE SLEEP APNEA: Status: RESOLVED | Noted: 2024-01-08 | Resolved: 2024-01-29

## 2024-01-29 PROBLEM — R06.83 SNORING: Status: RESOLVED | Noted: 2024-01-08 | Resolved: 2024-01-29

## 2024-01-29 PROBLEM — R40.0 DAYTIME SOMNOLENCE: Status: RESOLVED | Noted: 2024-01-08 | Resolved: 2024-01-29

## 2024-01-29 PROCEDURE — 99214 OFFICE O/P EST MOD 30 MIN: CPT | Performed by: PHYSICIAN ASSISTANT

## 2024-01-29 RX ORDER — SERTRALINE HYDROCHLORIDE 100 MG/1
100 TABLET, FILM COATED ORAL DAILY
Qty: 90 TABLET | Refills: 3 | Status: SHIPPED | OUTPATIENT
Start: 2024-01-29

## 2024-01-29 NOTE — PATIENT INSTRUCTIONS
Assessment/plan:  1.  Anxiety-patient is presently stable with Zoloft 100 mg daily.  He is content to continue medication at current dosage.  Refill was sent to his pharmacy.  2.  Compound melanocytic nevus-patient is encouraged to follow-up with dermatology to review report or treatment guidelines.  At the very least he should be seeing them with some regularity.  Patient verbalizes understanding and agreement with plan.  3.  Difficulties with conception-patient has only been trying for a few months thus far but does have slightly low normal sperm count at 35%.  Would consider further fertility evaluation if problems persist.  4.  Healthcare maintenance-would recommend testing blood work.  No family history of colon or prostate cancer and thus we will start screening at normal age of 46.

## 2024-01-29 NOTE — PROGRESS NOTES
Name: Addy Trotter      : 1995      MRN: [id number]  Encounter Provider: Orestes Spears PA-C  Encounter Date: 2024   Encounter department: [facility]    Assessment & Plan     Patient Instructions   Assessment/plan:  1.  Anxiety-patient is presently stable with Zoloft 100 mg daily.  He is content to continue medication at current dosage.  Refill was sent to his pharmacy.  2.  Compound melanocytic nevus-patient is encouraged to follow-up with dermatology to review report or treatment guidelines.  At the very least he should be seeing them with some regularity.  Patient verbalizes understanding and agreement with plan.  3.  Difficulties with conception-patient has only been trying for a few months thus far but does have slightly low normal sperm count at 35%.  Would consider further fertility evaluation if problems persist.  4.  Healthcare maintenance-would recommend testing blood work.  No family history of colon or prostate cancer and thus we will start screening at normal age of 46.    1. Health care maintenance  -     CBC and differential  -     Comprehensive metabolic panel  -     Lipid Panel with Direct LDL reflex  -     TSH, 3rd generation with Free T4 reflex    2. DAFNE (generalized anxiety disorder)  -     sertraline (Zoloft) 100 mg tablet; Take 1 tablet (100 mg total) by mouth daily    3. Melanocytic nevus of trunk           Subjective      HPI: This is a 28-year-old gentleman that presents to the office for follow-up of chronic health conditions and anxiety.  He has been feeling well for the most part with sertraline and has been in remission with therapy.  He denies any side effects from the medication and is happy to continue.  He also had sleep study performed in November and it was normal.  He went for evaluation of 2 moles by dermatology and had excision.  He has not been in contact with them after biopsy.  He also had semen analysis and has been having trouble  "conceiving child for just a few months.  He would like to review this.      Review of Systems   Constitutional:  Negative for chills, fatigue and fever.   HENT:  Negative for congestion, ear pain and sinus pressure.    Eyes:  Negative for visual disturbance.   Respiratory:  Negative for cough, chest tightness and shortness of breath.    Cardiovascular:  Negative for chest pain and palpitations.   Gastrointestinal:  Negative for diarrhea, nausea and vomiting.   Endocrine: Negative for polyuria.   Genitourinary:  Negative for dysuria and frequency.   Musculoskeletal:  Negative for arthralgias and myalgias.   Skin:  Negative for pallor and rash.   Neurological:  Negative for dizziness, weakness, light-headedness, numbness and headaches.   Psychiatric/Behavioral:  Negative for agitation, behavioral problems and sleep disturbance.    All other systems reviewed and are negative.      Current Outpatient Medications on File Prior to Visit   Medication Sig   • [DISCONTINUED] sertraline (Zoloft) 100 mg tablet Take 1 tablet (100 mg total) by mouth daily   • [DISCONTINUED] Flowflex COVID-19 Ag Home Test KIT FOLLOW INSTRUCTIONS INCLUDED WITH THE PACKAGE. (Patient not taking: Reported on 12/8/2023)   • [DISCONTINUED] multivitamin (THERAGRAN) TABS Take 1 tablet by mouth daily       Objective     /76 (BP Location: Left arm, Patient Position: Sitting, Cuff Size: Large)   Pulse 91   Ht 5' 11\" (1.803 m)   Wt 93 kg (205 lb)   SpO2 96%   BMI 28.59 kg/m²     Physical Exam  Vitals and nursing note reviewed.   Constitutional:       General: He is not in acute distress.     Appearance: He is well-developed.   HENT:      Head: Normocephalic and atraumatic.      Right Ear: External ear normal.      Left Ear: External ear normal.      Nose: Nose normal.      Mouth/Throat:      Pharynx: No oropharyngeal exudate.   Eyes:      Conjunctiva/sclera: Conjunctivae normal.      Pupils: Pupils are equal, round, and reactive to light.   Neck: "      Thyroid: No thyromegaly.      Trachea: No tracheal deviation.   Cardiovascular:      Rate and Rhythm: Normal rate and regular rhythm.      Heart sounds: Normal heart sounds. No murmur heard.     No friction rub.   Pulmonary:      Effort: Pulmonary effort is normal. No respiratory distress.      Breath sounds: Normal breath sounds. No wheezing or rales.   Abdominal:      General: Bowel sounds are normal. There is no distension.      Palpations: Abdomen is soft.      Tenderness: There is no abdominal tenderness. There is no guarding or rebound.   Musculoskeletal:         General: No tenderness. Normal range of motion.      Cervical back: Normal range of motion and neck supple.   Lymphadenopathy:      Cervical: No cervical adenopathy.   Skin:     General: Skin is warm and dry.      Findings: No erythema or rash.   Neurological:      Mental Status: He is alert and oriented to person, place, and time.      Cranial Nerves: No cranial nerve deficit.      Coordination: Coordination normal.   Psychiatric:         Behavior: Behavior normal.         Thought Content: Thought content normal.       Orestes Spears PA-C

## 2025-02-05 DIAGNOSIS — F41.1 GAD (GENERALIZED ANXIETY DISORDER): ICD-10-CM

## 2025-02-05 RX ORDER — SERTRALINE HYDROCHLORIDE 100 MG/1
100 TABLET, FILM COATED ORAL DAILY
Qty: 90 TABLET | Refills: 3 | Status: SHIPPED | OUTPATIENT
Start: 2025-02-05